# Patient Record
Sex: MALE | Race: WHITE | NOT HISPANIC OR LATINO | URBAN - METROPOLITAN AREA
[De-identification: names, ages, dates, MRNs, and addresses within clinical notes are randomized per-mention and may not be internally consistent; named-entity substitution may affect disease eponyms.]

---

## 2019-12-04 ENCOUNTER — OUTPATIENT (OUTPATIENT)
Dept: OUTPATIENT SERVICES | Facility: HOSPITAL | Age: 49
LOS: 1 days | End: 2019-12-04
Payer: COMMERCIAL

## 2019-12-04 VITALS
HEART RATE: 77 BPM | TEMPERATURE: 98 F | SYSTOLIC BLOOD PRESSURE: 129 MMHG | HEIGHT: 72 IN | RESPIRATION RATE: 18 BRPM | OXYGEN SATURATION: 95 % | DIASTOLIC BLOOD PRESSURE: 86 MMHG | WEIGHT: 266.98 LBS

## 2019-12-04 DIAGNOSIS — I10 ESSENTIAL (PRIMARY) HYPERTENSION: ICD-10-CM

## 2019-12-04 DIAGNOSIS — Z98.890 OTHER SPECIFIED POSTPROCEDURAL STATES: Chronic | ICD-10-CM

## 2019-12-04 DIAGNOSIS — Z98.89 OTHER SPECIFIED POSTPROCEDURAL STATES: Chronic | ICD-10-CM

## 2019-12-04 DIAGNOSIS — H50.10 UNSPECIFIED EXOTROPIA: Chronic | ICD-10-CM

## 2019-12-04 DIAGNOSIS — E11.9 TYPE 2 DIABETES MELLITUS WITHOUT COMPLICATIONS: ICD-10-CM

## 2019-12-04 DIAGNOSIS — Z01.818 ENCOUNTER FOR OTHER PREPROCEDURAL EXAMINATION: ICD-10-CM

## 2019-12-04 DIAGNOSIS — K80.20 CALCULUS OF GALLBLADDER WITHOUT CHOLECYSTITIS WITHOUT OBSTRUCTION: ICD-10-CM

## 2019-12-04 DIAGNOSIS — L02.91 CUTANEOUS ABSCESS, UNSPECIFIED: Chronic | ICD-10-CM

## 2019-12-04 LAB
ALBUMIN SERPL ELPH-MCNC: 4.6 G/DL — SIGNIFICANT CHANGE UP (ref 3.3–5)
ALP SERPL-CCNC: 55 U/L — SIGNIFICANT CHANGE UP (ref 40–120)
ALT FLD-CCNC: 33 U/L — SIGNIFICANT CHANGE UP (ref 10–45)
ANION GAP SERPL CALC-SCNC: 13 MMOL/L — SIGNIFICANT CHANGE UP (ref 5–17)
AST SERPL-CCNC: 24 U/L — SIGNIFICANT CHANGE UP (ref 10–40)
BILIRUB SERPL-MCNC: 1.2 MG/DL — SIGNIFICANT CHANGE UP (ref 0.2–1.2)
BUN SERPL-MCNC: 14 MG/DL — SIGNIFICANT CHANGE UP (ref 7–23)
CALCIUM SERPL-MCNC: 10.1 MG/DL — SIGNIFICANT CHANGE UP (ref 8.4–10.5)
CHLORIDE SERPL-SCNC: 103 MMOL/L — SIGNIFICANT CHANGE UP (ref 96–108)
CO2 SERPL-SCNC: 23 MMOL/L — SIGNIFICANT CHANGE UP (ref 22–31)
CREAT SERPL-MCNC: 0.76 MG/DL — SIGNIFICANT CHANGE UP (ref 0.5–1.3)
GLUCOSE SERPL-MCNC: 160 MG/DL — HIGH (ref 70–99)
HBA1C BLD-MCNC: 7.7 % — HIGH (ref 4–5.6)
HCT VFR BLD CALC: 50.2 % — HIGH (ref 39–50)
HGB BLD-MCNC: 17.3 G/DL — HIGH (ref 13–17)
MCHC RBC-ENTMCNC: 29 PG — SIGNIFICANT CHANGE UP (ref 27–34)
MCHC RBC-ENTMCNC: 34.5 GM/DL — SIGNIFICANT CHANGE UP (ref 32–36)
MCV RBC AUTO: 84.1 FL — SIGNIFICANT CHANGE UP (ref 80–100)
PLATELET # BLD AUTO: 175 K/UL — SIGNIFICANT CHANGE UP (ref 150–400)
POTASSIUM SERPL-MCNC: 4.4 MMOL/L — SIGNIFICANT CHANGE UP (ref 3.5–5.3)
POTASSIUM SERPL-SCNC: 4.4 MMOL/L — SIGNIFICANT CHANGE UP (ref 3.5–5.3)
PROT SERPL-MCNC: 8.1 G/DL — SIGNIFICANT CHANGE UP (ref 6–8.3)
RBC # BLD: 5.97 M/UL — HIGH (ref 4.2–5.8)
RBC # FLD: 13.2 % — SIGNIFICANT CHANGE UP (ref 10.3–14.5)
SODIUM SERPL-SCNC: 139 MMOL/L — SIGNIFICANT CHANGE UP (ref 135–145)
WBC # BLD: 7.36 K/UL — SIGNIFICANT CHANGE UP (ref 3.8–10.5)
WBC # FLD AUTO: 7.36 K/UL — SIGNIFICANT CHANGE UP (ref 3.8–10.5)

## 2019-12-04 PROCEDURE — 80053 COMPREHEN METABOLIC PANEL: CPT

## 2019-12-04 PROCEDURE — 85027 COMPLETE CBC AUTOMATED: CPT

## 2019-12-04 PROCEDURE — G0463: CPT

## 2019-12-04 PROCEDURE — 83036 HEMOGLOBIN GLYCOSYLATED A1C: CPT

## 2019-12-04 RX ORDER — VANCOMYCIN HCL 1 G
1500 VIAL (EA) INTRAVENOUS ONCE
Refills: 0 | Status: DISCONTINUED | OUTPATIENT
Start: 2019-12-10 | End: 2019-12-28

## 2019-12-04 RX ORDER — LIDOCAINE HCL 20 MG/ML
0.2 VIAL (ML) INJECTION ONCE
Refills: 0 | Status: DISCONTINUED | OUTPATIENT
Start: 2019-12-10 | End: 2019-12-10

## 2019-12-04 RX ORDER — SODIUM CHLORIDE 9 MG/ML
3 INJECTION INTRAMUSCULAR; INTRAVENOUS; SUBCUTANEOUS EVERY 8 HOURS
Refills: 0 | Status: DISCONTINUED | OUTPATIENT
Start: 2019-12-10 | End: 2019-12-10

## 2019-12-04 NOTE — H&P PST ADULT - HISTORY OF PRESENT ILLNESS
49yr old male presents to PST for a Laparoscopic Cholecystectomy on 12/10/2019. Pt w/ hx of HTN, HLD and DM2. Denies fevers, chills, N&V, chest pain or SOB and feels great otherwise. Pt to see PCP on 12/5/2019 for medical evaluation.

## 2019-12-04 NOTE — H&P PST ADULT - NSANTHOSAYNRD_GEN_A_CORE
No. NIRMAL screening performed.  STOP BANG Legend: 0-2 = LOW Risk; 3-4 = INTERMEDIATE Risk; 5-8 = HIGH Risk

## 2019-12-04 NOTE — H&P PST ADULT - NSICDXPROBLEM_GEN_ALL_CORE_FT
PROBLEM DIAGNOSES  Problem: Type 2 diabetes mellitus  Assessment and Plan: Pt to stop Invokamet on 12/6/2019 and last dose of pioglitazone on 12/9/2019. Pt to check FS morning of procedure.     Problem: Gallbladder calculus without cholecystitis and no obstruction  Assessment and Plan: Pt scheduled for sx 12/10/2019. Surgical and chlorhexidine instructions reviewed w/ pt.     Problem: Hypertension  Assessment and Plan: Pt to continue oral medication as prescribed.

## 2019-12-04 NOTE — H&P PST ADULT - NSICDXPASTMEDICALHX_GEN_ALL_CORE_FT
PAST MEDICAL HISTORY:  Diabetes Mellitus Type II     Gallbladder calculus without cholecystitis and no obstruction     Hypercholesterolemia     Hypertension

## 2019-12-04 NOTE — H&P PST ADULT - NSICDXPASTSURGICALHX_GEN_ALL_CORE_FT
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

## 2019-12-09 ENCOUNTER — TRANSCRIPTION ENCOUNTER (OUTPATIENT)
Age: 49
End: 2019-12-09

## 2019-12-10 ENCOUNTER — OUTPATIENT (OUTPATIENT)
Dept: OUTPATIENT SERVICES | Facility: HOSPITAL | Age: 49
LOS: 1 days | End: 2019-12-10
Payer: COMMERCIAL

## 2019-12-10 ENCOUNTER — RESULT REVIEW (OUTPATIENT)
Age: 49
End: 2019-12-10

## 2019-12-10 VITALS
RESPIRATION RATE: 16 BRPM | OXYGEN SATURATION: 97 % | HEART RATE: 78 BPM | SYSTOLIC BLOOD PRESSURE: 144 MMHG | TEMPERATURE: 98 F | DIASTOLIC BLOOD PRESSURE: 80 MMHG

## 2019-12-10 VITALS
SYSTOLIC BLOOD PRESSURE: 144 MMHG | OXYGEN SATURATION: 99 % | HEART RATE: 96 BPM | TEMPERATURE: 98 F | DIASTOLIC BLOOD PRESSURE: 76 MMHG | RESPIRATION RATE: 16 BRPM

## 2019-12-10 DIAGNOSIS — H50.10 UNSPECIFIED EXOTROPIA: Chronic | ICD-10-CM

## 2019-12-10 DIAGNOSIS — K80.20 CALCULUS OF GALLBLADDER WITHOUT CHOLECYSTITIS WITHOUT OBSTRUCTION: ICD-10-CM

## 2019-12-10 DIAGNOSIS — L02.91 CUTANEOUS ABSCESS, UNSPECIFIED: Chronic | ICD-10-CM

## 2019-12-10 DIAGNOSIS — Z98.890 OTHER SPECIFIED POSTPROCEDURAL STATES: Chronic | ICD-10-CM

## 2019-12-10 DIAGNOSIS — Z98.89 OTHER SPECIFIED POSTPROCEDURAL STATES: Chronic | ICD-10-CM

## 2019-12-10 LAB
GLUCOSE BLDC GLUCOMTR-MCNC: 199 MG/DL — HIGH (ref 70–99)
GLUCOSE BLDC GLUCOMTR-MCNC: 213 MG/DL — HIGH (ref 70–99)
GLUCOSE BLDC GLUCOMTR-MCNC: 279 MG/DL — HIGH (ref 70–99)

## 2019-12-10 PROCEDURE — 47562 LAPAROSCOPIC CHOLECYSTECTOMY: CPT

## 2019-12-10 PROCEDURE — 82962 GLUCOSE BLOOD TEST: CPT

## 2019-12-10 PROCEDURE — 88304 TISSUE EXAM BY PATHOLOGIST: CPT

## 2019-12-10 PROCEDURE — C1889: CPT

## 2019-12-10 PROCEDURE — 88304 TISSUE EXAM BY PATHOLOGIST: CPT | Mod: 26

## 2019-12-10 RX ORDER — SODIUM CHLORIDE 9 MG/ML
500 INJECTION, SOLUTION INTRAVENOUS
Refills: 0 | Status: DISCONTINUED | OUTPATIENT
Start: 2019-12-10 | End: 2019-12-28

## 2019-12-10 RX ORDER — ONDANSETRON 8 MG/1
4 TABLET, FILM COATED ORAL ONCE
Refills: 0 | Status: DISCONTINUED | OUTPATIENT
Start: 2019-12-10 | End: 2019-12-10

## 2019-12-10 RX ORDER — OXYCODONE HYDROCHLORIDE 5 MG/1
1 TABLET ORAL
Qty: 16 | Refills: 0
Start: 2019-12-10 | End: 2019-12-13

## 2019-12-10 RX ORDER — INSULIN LISPRO 100/ML
3 VIAL (ML) SUBCUTANEOUS ONCE
Refills: 0 | Status: COMPLETED | OUTPATIENT
Start: 2019-12-10 | End: 2019-12-10

## 2019-12-10 RX ORDER — IBUPROFEN 200 MG
3 TABLET ORAL
Qty: 60 | Refills: 0
Start: 2019-12-10 | End: 2019-12-14

## 2019-12-10 RX ORDER — ACETAMINOPHEN 500 MG
2 TABLET ORAL
Qty: 24 | Refills: 0
Start: 2019-12-10 | End: 2019-12-12

## 2019-12-10 RX ORDER — HYDROMORPHONE HYDROCHLORIDE 2 MG/ML
0.5 INJECTION INTRAMUSCULAR; INTRAVENOUS; SUBCUTANEOUS
Refills: 0 | Status: DISCONTINUED | OUTPATIENT
Start: 2019-12-10 | End: 2019-12-10

## 2019-12-10 RX ORDER — OXYCODONE HYDROCHLORIDE 5 MG/1
5 TABLET ORAL EVERY 6 HOURS
Refills: 0 | Status: DISCONTINUED | OUTPATIENT
Start: 2019-12-10 | End: 2019-12-10

## 2019-12-10 RX ADMIN — Medication 3 UNIT(S): at 18:08

## 2019-12-10 RX ADMIN — SODIUM CHLORIDE 3 MILLILITER(S): 9 INJECTION INTRAMUSCULAR; INTRAVENOUS; SUBCUTANEOUS at 12:45

## 2019-12-10 RX ADMIN — HYDROMORPHONE HYDROCHLORIDE 0.5 MILLIGRAM(S): 2 INJECTION INTRAMUSCULAR; INTRAVENOUS; SUBCUTANEOUS at 19:00

## 2019-12-10 RX ADMIN — SODIUM CHLORIDE 50 MILLILITER(S): 9 INJECTION, SOLUTION INTRAVENOUS at 18:09

## 2019-12-10 RX ADMIN — HYDROMORPHONE HYDROCHLORIDE 0.5 MILLIGRAM(S): 2 INJECTION INTRAMUSCULAR; INTRAVENOUS; SUBCUTANEOUS at 18:39

## 2019-12-10 NOTE — ASU DISCHARGE PLAN (ADULT/PEDIATRIC) - CARE PROVIDER_API CALL
Miguel Angel Cronin)  Surgery  [address], [address]  [place] [address]  Phone: [phone]  Fax: [phone]  Established Patient  Follow Up Time: 2 weeks

## 2019-12-10 NOTE — ASU PREOP CHECKLIST - IV STARTED
Patient transferred from [place] to [facility]. Dual skin assessment performed with LATANYA Dougherty. No concerns at this time. Pt stable,A&O x 4 and ambulates independently. Will continue to monitor.    yes

## 2019-12-10 NOTE — BRIEF OPERATIVE NOTE - ELECTIVE PROCEDURE
Yes
Anxiety    Breast cancer in female    GERD (gastroesophageal reflux disease)    HTN (hypertension)    Hyperlipidemia

## 2019-12-10 NOTE — BRIEF OPERATIVE NOTE - NSICDXBRIEFPOSTOP_GEN_ALL_CORE_FT
POST-OP DIAGNOSIS:  Calculus of gallbladder without cholecystitis without obstruction 10-Dec-2019 17:35:56  Asad Hendricks

## 2019-12-10 NOTE — BRIEF OPERATIVE NOTE - NSICDXBRIEFPREOP_GEN_ALL_CORE_FT
PRE-OP DIAGNOSIS:  Calculus of gallbladder without cholecystitis without obstruction 10-Dec-2019 17:35:47  Asad Hendricks

## 2019-12-10 NOTE — ASU DISCHARGE PLAN (ADULT/PEDIATRIC) - CALL YOUR DOCTOR IF YOU HAVE ANY OF THE FOLLOWING:
Wound/Surgical Site with redness, or foul smelling discharge or pus/Swelling that gets worse/Pain not relieved by Medications

## 2019-12-17 LAB — SURGICAL PATHOLOGY STUDY: SIGNIFICANT CHANGE UP

## 2020-02-04 NOTE — PACU DISCHARGE NOTE - NSCLINEINSERTRD_GEN_ALL_CORE
No Remote history, treated with lumpectomy, RT and Tamoxifen, no chemotherapy  currently in remission   follows with Dr. Blank Continue with home medication of Amlodipine

## 2021-02-01 NOTE — ASU DISCHARGE PLAN (ADULT/PEDIATRIC) - NO HEAVY LIFTING DURATION
Pt stated he went to [facility] for a 72 hour hold. Is now need paper work filled out for [employer]. Pt stated he seen Dr. Jade Waters for this. Put paper work in Dr. Diana Talamantes.     Last appt.  1/21/2021  Future Appointments   Date Time Provider Aashish Staley   3/18/2021  7:30 AM Smith Mark DO [facility] [place]
2 weeks

## 2021-03-08 NOTE — H&P PST ADULT - VENOUS THROMBOEMBOLISM
Partially impaired: cannot see medication labels or newsprint, but can see obstacles in path, and the surrounding layout; can count fingers at arm's length
no

## 2023-05-03 PROBLEM — K80.20 CALCULUS OF GALLBLADDER WITHOUT CHOLECYSTITIS WITHOUT OBSTRUCTION: Chronic | Status: ACTIVE | Noted: 2019-12-04

## 2023-05-03 PROBLEM — I10 ESSENTIAL (PRIMARY) HYPERTENSION: Chronic | Status: ACTIVE | Noted: 2019-12-04

## 2023-06-26 ENCOUNTER — LABORATORY RESULT (OUTPATIENT)
Age: 53
End: 2023-06-26

## 2023-06-26 ENCOUNTER — APPOINTMENT (OUTPATIENT)
Dept: ORTHOPEDIC SURGERY | Facility: CLINIC | Age: 53
End: 2023-06-26
Payer: COMMERCIAL

## 2023-06-26 VITALS — WEIGHT: 253 LBS | BODY MASS INDEX: 34.27 KG/M2 | HEIGHT: 72 IN

## 2023-06-26 DIAGNOSIS — Z86.39 PERSONAL HISTORY OF OTHER ENDOCRINE, NUTRITIONAL AND METABOLIC DISEASE: ICD-10-CM

## 2023-06-26 DIAGNOSIS — Z78.9 OTHER SPECIFIED HEALTH STATUS: ICD-10-CM

## 2023-06-26 LAB
B PERT IGG+IGM PNL SER: ABNORMAL
COLOR FLD: YELLOW
EOSINOPHIL # FLD MANUAL: 0 %
FLUID INTAKE SUBSTANCE CLASS: NORMAL
LYMPHOCYTES # FLD MANUAL: 46 %
MESOTHL CELL NFR FLD: 0 %
MONOS+MACROS NFR FLD MANUAL: 39 %
NEUTS SEG # FLD MANUAL: 15 %
NRBC # FLD: 0 %
RBC # FLD MANUAL: ABNORMAL /UL
SYCRY CLARITY: ABNORMAL
SYCRY COLOR: YELLOW
SYCRY ID: ABNORMAL
SYCRY TUBE: NORMAL
TOTAL CELLS COUNTED FLD: 382 /UL
TUBE TYPE: NORMAL
UNIDENT CELLS NFR FLD MANUAL: 0 %
VARIANT LYMPHS # FLD MANUAL: 0 %

## 2023-06-26 PROCEDURE — 20610 DRAIN/INJ JOINT/BURSA W/O US: CPT | Mod: RT

## 2023-06-26 PROCEDURE — 99204 OFFICE O/P NEW MOD 45 MIN: CPT | Mod: 25

## 2023-06-26 PROCEDURE — 73562 X-RAY EXAM OF KNEE 3: CPT | Mod: RT

## 2023-06-26 RX ORDER — ATORVASTATIN CALCIUM 20 MG/1
20 TABLET, FILM COATED ORAL
Refills: 0 | Status: ACTIVE | COMMUNITY

## 2023-06-26 RX ORDER — LOSARTAN POTASSIUM 25 MG/1
25 TABLET, FILM COATED ORAL
Refills: 0 | Status: ACTIVE | COMMUNITY

## 2023-06-26 NOTE — HISTORY OF PRESENT ILLNESS
[Worsening] : worsening [8] : a minimum pain level of 8/10 [10] : a maximum pain level of 10/10 [de-identified] : 52 y/o M pt presents with right knee pain. The pt has been having pain since April 2021 at [facility]. He has a prior right knee replacement. He has a painful total knee. He talked with a different orthopedist. He is here for a 2nd opinion. He has an MRI of the right knee. right knee replacement. The notes constant pain and localized.\par He states his pain is sharp, achy, and throbbing. He was seen by neurootologist and his blood work showed high SED. He is ambulating with a cane. He notes worsening pain with bending and walking. He has a mhx of DM.

## 2023-06-26 NOTE — DISCUSSION/SUMMARY
[Medication Risks Reviewed] : Medication risks reviewed [Surgical risks reviewed] : Surgical risks reviewed [de-identified] : 54 y/o M pt is s/p painful right TKA from 2021. We reassured the pt that his implants are stable with no evidence of loosening or wear. We discussed a possibility of a revision right TKA, however the pt understands the risk of surgery with no guarantee that it will improve his pain. We conducted an aspiration of the right knee and we will send it for cell count, cultures, and crystals to rule out infection. The pt will f/u with us via telehealth to discuss the aspiration results. All questions were answered.

## 2023-06-26 NOTE — PHYSICAL EXAM
[de-identified] : GENERAL APPEARANCE: Well nourished and hydrated, pleasant, alert, and oriented x 3. Appears their stated age. \par HEENT: Normocephalic, extraocular eye motion intact. Nasal septum midline. Oral cavity clear. External auditory canal clear. \par RESPIRATORY: Breath sounds clear and audible in all lobes. No wheezing, No accessory muscle use.\par CARDIOVASCULAR: No apparent abnormalities. No lower leg edema. No varicosities. Pedal pulses are palpable.\par NEUROLOGIC: Sensation is normal, no muscle weakness in the upper or lower extremities.\par DERMATOLOGIC: No apparent skin lesions, moist, warm, no rash.\par SPINE: Cervical spine appears normal and moves freely; thoracic spine appears normal and moves freely; lumbosacral spine appears normal and moves freely, normal, nontender.\par MUSCULOSKELETAL: Hands, wrists, and elbows are normal and move freely, shoulders are normal and move freely.  [de-identified] : Right knee exam shows healed incision with no sign of infection. ROM 0-95 [de-identified] : 3V xray of the right knee done in office today and reviewed by Dr. Agustin Yune demonstrates s/p right knee implants in good positioning with no evidence of wear, loosening, or subsidence. \par \par MRI of the right knee done at [facility] on March 7th, 2023 shows: \par 1. Limited study. Severe susceptibility artifact present from component of total knee arthroplasty. \par 2. Finding concerning for the possibility of preprosthetic fracture in both the distal femur and proximal tibia. Recommend CT examination for further evaluation.

## 2023-07-03 ENCOUNTER — APPOINTMENT (OUTPATIENT)
Dept: ORTHOPEDIC SURGERY | Facility: CLINIC | Age: 53
End: 2023-07-03
Payer: COMMERCIAL

## 2023-07-03 PROCEDURE — 99442: CPT

## 2023-07-03 NOTE — HISTORY OF PRESENT ILLNESS
[Home] : at home, [unfilled] , at the time of the visit. [Medical Office: ([facility])___] : at the medical office located in  [Verbal consent obtained from patient] : the patient, [unfilled] [de-identified] : I called the patient for follow-up of his painful right total knee arthroplasty.  He does have extracellular CPPD crystals noted.  The cell count was reassuring at 382 nucleated cells.  His cultures remain negative.  I think we can say that we ruled out infection.  He does have another aspiration planned with the radiologist prior to his follow-up with Dr. Schumacher.  I encouraged him to see Dr. Schumacher back.  I noted that we would resend the results of the aspiration to the patient.  At this point I do not see a clear-cut indication  for revision surgery.  I encouraged him to see a rheumatologist to discuss the CPPD crystals.\par \par He will follow-up with me on an as-needed basis

## 2023-07-03 NOTE — DISCUSSION/SUMMARY
[de-identified] : I called the patient for follow-up of his painful right total knee arthroplasty.  He does have extracellular CPPD crystals noted.  The cell count was reassuring at 382 nucleated cells.  His cultures remain negative.  I think we can say that we ruled out infection.  He does have another aspiration planned with the radiologist prior to his follow-up with Dr. Schumacher.  I encouraged him to see Dr. Schumacher back.  I noted that we would resend the results of the aspiration to the patient.  At this point I do not see a clear-cut indication  for revision surgery.  I encouraged him to see a rheumatologist to discuss the CPPD crystals.\par \par He will follow-up with me on an as-needed basis

## 2023-11-06 ENCOUNTER — APPOINTMENT (OUTPATIENT)
Dept: ORTHOPEDIC SURGERY | Facility: CLINIC | Age: 53
End: 2023-11-06

## 2023-11-09 ENCOUNTER — APPOINTMENT (OUTPATIENT)
Dept: ORTHOPEDIC SURGERY | Facility: CLINIC | Age: 53
End: 2023-11-09
Payer: COMMERCIAL

## 2023-11-09 PROCEDURE — 99214 OFFICE O/P EST MOD 30 MIN: CPT

## 2023-11-09 PROCEDURE — 73562 X-RAY EXAM OF KNEE 3: CPT | Mod: 26,RT

## 2023-11-09 RX ORDER — CELECOXIB 200 MG/1
200 CAPSULE ORAL
Qty: 60 | Refills: 0 | Status: ACTIVE | COMMUNITY
Start: 2023-11-09 | End: 1900-01-01

## 2023-11-21 ENCOUNTER — OFFICE (OUTPATIENT)
Dept: URBAN - METROPOLITAN AREA CLINIC 6 | Facility: CLINIC | Age: 53
Setting detail: OPHTHALMOLOGY
End: 2023-11-21
Payer: COMMERCIAL

## 2023-11-21 ENCOUNTER — RX ONLY (RX ONLY)
Age: 53
End: 2023-11-21

## 2023-11-21 DIAGNOSIS — E11.3293: ICD-10-CM

## 2023-11-21 PROBLEM — H52.7 REFRACTIVE ERROR: Status: ACTIVE | Noted: 2023-11-21

## 2023-11-21 PROBLEM — E11.9 DIABETES TYPE 2 NO RETINOPATHY; BOTH MILD WITHOUT ME: Status: ACTIVE | Noted: 2023-11-21

## 2023-11-21 PROBLEM — H50.10 EXOTROPIA, UNSPECIFIED: Status: ACTIVE | Noted: 2023-11-21

## 2023-11-21 PROCEDURE — 92004 COMPRE OPH EXAM NEW PT 1/>: CPT | Performed by: OPHTHALMOLOGY

## 2023-11-21 ASSESSMENT — REFRACTION_MANIFEST
OS_SPHERE: -1.50
OD_SPHERE: -1.00
OD_SPHERE: -1.00
OD_AXIS: 025
OS_VA1: 20/20
OD_VA1: 20/20
OS_VA1: 20/20
OU_VA: 20/20
OD_CYLINDER: -1.50
OD_VA1: 20/20
OD_CYLINDER: -1.50
OS_SPHERE: -1.50
OS_AXIS: 145
OS_CYLINDER: -0.50
OD_AXIS: 025
OS_CYLINDER: -0.50
OU_VA: 20/20
OS_AXIS: 145

## 2023-11-21 ASSESSMENT — REFRACTION_AUTOREFRACTION
OD_AXIS: 024
OS_SPHERE: -1.50
OS_AXIS: 146
OD_CYLINDER: -1.50
OS_CYLINDER: -0.50
OD_SPHERE: -1.00

## 2023-11-21 ASSESSMENT — SPHEQUIV_DERIVED
OD_SPHEQUIV: -1.75
OS_SPHEQUIV: -1.75
OD_SPHEQUIV: -1.75
OS_SPHEQUIV: -1.75
OS_SPHEQUIV: -1.75
OD_SPHEQUIV: -1.75

## 2023-11-21 ASSESSMENT — REFRACTION_CURRENTRX
OS_SPHERE: -1.25
OS_AXIS: 162
OD_SPHERE: -0.75
OS_OVR_VA: 20/
OD_CYLINDER: -2.00
OD_OVR_VA: 20/
OS_VPRISM_DIRECTION: SV
OS_CYLINDER: -1.00
OD_VPRISM_DIRECTION: SV
OD_AXIS: 014

## 2023-11-21 ASSESSMENT — CONFRONTATIONAL VISUAL FIELD TEST (CVF)
OS_FINDINGS: FULL
OD_FINDINGS: FULL

## 2024-01-24 ENCOUNTER — OFFICE (OUTPATIENT)
Dept: URBAN - METROPOLITAN AREA CLINIC 6 | Facility: CLINIC | Age: 54
Setting detail: OPHTHALMOLOGY
End: 2024-01-24
Payer: COMMERCIAL

## 2024-01-24 DIAGNOSIS — H50.21: ICD-10-CM

## 2024-01-24 DIAGNOSIS — H50.10: ICD-10-CM

## 2024-01-24 PROCEDURE — 92060 SENSORIMOTOR EXAMINATION: CPT | Performed by: OPHTHALMOLOGY

## 2024-01-24 PROCEDURE — 99214 OFFICE O/P EST MOD 30 MIN: CPT | Performed by: OPHTHALMOLOGY

## 2024-01-24 ASSESSMENT — REFRACTION_AUTOREFRACTION
OS_CYLINDER: -0.50
OS_SPHERE: -1.75
OD_AXIS: 023
OD_SPHERE: -1.00
OS_AXIS: 145
OD_CYLINDER: -1.75

## 2024-01-24 ASSESSMENT — REFRACTION_MANIFEST
OD_VA1: 20/20
OD_SPHERE: -1.00
OD_VA1: 20/20
OD_AXIS: 025
OD_CYLINDER: -1.50
OD_CYLINDER: -1.50
OS_VA1: 20/20
OU_VA: 20/20
OS_SPHERE: -1.50
OS_AXIS: 145
OS_AXIS: 145
OD_AXIS: 025
OU_VA: 20/20
OD_SPHERE: -1.00
OS_VA1: 20/20
OS_SPHERE: -1.50
OS_CYLINDER: -0.50
OS_CYLINDER: -0.50

## 2024-01-24 ASSESSMENT — REFRACTION_CURRENTRX
OD_SPHERE: -0.75
OS_SPHERE: -1.25
OS_AXIS: 162
OD_VPRISM_DIRECTION: SV
OD_OVR_VA: 20/
OD_CYLINDER: -2.00
OS_OVR_VA: 20/
OS_VPRISM_DIRECTION: SV
OS_CYLINDER: -1.00
OD_AXIS: 014

## 2024-01-24 ASSESSMENT — SPHEQUIV_DERIVED
OD_SPHEQUIV: -1.875
OS_SPHEQUIV: -2
OS_SPHEQUIV: -1.75
OD_SPHEQUIV: -1.75
OD_SPHEQUIV: -1.75
OS_SPHEQUIV: -1.75

## 2024-01-24 ASSESSMENT — CONFRONTATIONAL VISUAL FIELD TEST (CVF)
OS_FINDINGS: FULL
OD_FINDINGS: FULL

## 2024-02-15 ENCOUNTER — APPOINTMENT (OUTPATIENT)
Dept: ORTHOPEDIC SURGERY | Facility: CLINIC | Age: 54
End: 2024-02-15
Payer: COMMERCIAL

## 2024-02-15 VITALS
BODY MASS INDEX: 34.13 KG/M2 | HEIGHT: 72 IN | DIASTOLIC BLOOD PRESSURE: 83 MMHG | HEART RATE: 89 BPM | WEIGHT: 252 LBS | SYSTOLIC BLOOD PRESSURE: 157 MMHG

## 2024-02-15 PROCEDURE — 99213 OFFICE O/P EST LOW 20 MIN: CPT

## 2024-02-15 NOTE — HISTORY OF PRESENT ILLNESS
[de-identified] : This is a 53 year old male here for follow up of right knee pain. He had RTKA in 2021 at [facility]. He was last seen in July for aspiration which was positive for pseudogout. He had repeat aspiration at [place] with his surgeon which was negative for any infection. he continues to have the sensation of instability in the right knee. He did have MRi which showed well fixed implants without complication. He does report persistent swelling in the knee. He has been takin 100mg of Celebrex once a day. She continues to have pain in the right knee that limits him from doing his normal activities.  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  He feels the knee is unstable and is going to give out on him.  He walks with a cane.  He feels this keeps him from his activities of daily living

## 2024-02-15 NOTE — DISCUSSION/SUMMARY
[de-identified] : Medication risks reviewed. Surgical risks reviewed. 52 y/o M pt is s/p painful right TKA from 2021. IHe has had two prior negative aspirations of the knee. Dr. Yuen has previously discussed a possibility of a revision right TKA, however the pt understands the risk of surgery with no guarantee that it will improve his pain. The patient will take Celebrex 200mg twice a day and was encouraged to follow up with rheumatology. The patient is going on a trip next week and will follow-up in the office with Dr. Yuen when he is back to discuss possible revision surgery

## 2024-02-15 NOTE — PHYSICAL EXAM
[de-identified] : GENERAL APPEARANCE: Well nourished and hydrated, pleasant, alert, and oriented x 3. Appears their stated age.  HEENT: Normocephalic, extraocular eye motion intact. Nasal septum midline. Oral cavity clear. External auditory canal clear.  RESPIRATORY: Breath sounds clear and audible in all lobes. No wheezing, No accessory muscle use.  CARDIOVASCULAR: No apparent abnormalities. No lower leg edema. No varicosities. Pedal pulses are palpable.  NEUROLOGIC: Sensation is normal, no muscle weakness in the upper or lower extremities.  DERMATOLOGIC: No apparent skin lesions, moist, warm, no rash.  SPINE: Cervical spine appears normal and moves freely; thoracic spine appears normal and moves freely; lumbosacral spine appears normal and moves freely, normal, nontender.  MUSCULOSKELETAL: Hands, wrists, and elbows are normal and move freely, shoulders are normal and move freely.      [de-identified] : Musculoskeletal:. Right knee exam shows healed incision with no sign of infection. ROM 0-105 [de-identified] :  Imaging: 3V xray of the right knee done in office today and reviewed by Dr. Agustin Yuen demonstrates s/p right knee implants in good positioning with no evidence of wear, loosening, or subsidence.

## 2024-03-06 ENCOUNTER — APPOINTMENT (OUTPATIENT)
Dept: ORTHOPEDIC SURGERY | Facility: CLINIC | Age: 54
End: 2024-03-06
Payer: COMMERCIAL

## 2024-03-06 VITALS — HEIGHT: 72 IN | WEIGHT: 252 LBS | BODY MASS INDEX: 34.13 KG/M2

## 2024-03-06 PROCEDURE — 99214 OFFICE O/P EST MOD 30 MIN: CPT

## 2024-03-06 RX ORDER — CELECOXIB 200 MG/1
200 CAPSULE ORAL TWICE DAILY
Qty: 60 | Refills: 1 | Status: ACTIVE | COMMUNITY
Start: 2024-03-06 | End: 1900-01-01

## 2024-03-06 NOTE — REVIEW OF SYSTEMS
[Joint Pain] : joint pain [Joint Swelling] : joint swelling [Joint Stiffness] : joint stiffness [Negative] : Heme/Lymph [[email or web address]] : Bilateral knee pain

## 2024-03-06 NOTE — DISCUSSION/SUMMARY
[Medication Risks Reviewed] : Medication risks reviewed [Surgical risks reviewed] : Surgical risks reviewed [de-identified] : 52 y/o M pt is s/p painful right TKA from 2021. The nature of his condition and treatment options were discussed in detail. He had RTKA in 2021 at [facility]. He was last seen in July for aspiration which was positive for pseudogout. He had repeat aspiration at [place] with his surgeon which was negative for any infection. he continues to have the sensation of instability in the right knee. He did have MRi which showed well fixed implants without complication. The pt is having worsening quality of life. The pt has exhausted conservative treatment such as anti-inflammatories (Celebrex) and low impact exercise. She continues to have pain in the right knee that limits him from doing his normal activities. The pt is losing his quality of life and is unable to enjoy his normal activities. 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  The pt understands that revision surgery is unpredictable and there is no guarantee that it will improve his pain control. The pt will talk with the surgical coordinator to schedule a revision right TKA. All questions were answered.   The patient is a 54 year year old individual with a failing right TKA. Based upon the patient's continued symptoms and failure to respond to conservative treatment (including HA injections, cortisone injections, over the counter medications, and PT) I have recommended a revision right total knee arthroplasty for this patient. A long discussion took place with the patient describing what a total joint replacement is and what the procedure would entail. A total knee arthroplasty model, similar to the implant that will be used during the operation, was utilized to demonstrate and to discuss the various bearing surfaces of the implants. The hospitalization and post-operative care and rehabilitation were also discussed. The use of perioperative antibiotics and DVT prophylaxis were discussed. The risk, benefits and alternatives to a surgical intervention were discussed at length with the patient. The patient was also advised of risks related to the medical comorbidities, elevated body mass index (BMI), and smoking where applicable. We discussed how to reduce modifiable risk factors and encouraged smoking cessation were applicable. A lengthy discussion took place to review the most common complications including but not limited to: deep vein thrombosis, pulmonary embolus, heart attack, stroke, infection, wound breakdown, numbness, damage to nerves, tendon, muscles, arteries or other blood vessels, death and other possible complications from anesthesia. The patient was told that we will take steps to minimize these risks by using sterile technique, antibiotics and DVT prophylaxis when appropriate and follow the patient postoperatively in the office setting to monitor progress. The possibility of recurrent pain, no improvement in pain and actual worsening of pain were also discussed with the patient. The discharge plan of care focused on the patient going home following surgery. The patient was encouraged to make the necessary arrangements to have someone stay with them when they are discharged home. Following discharge, a home care nurse will visit the patient. The home care nurse will open your home care case and request home physical therapy services. Home physical therapy will commence following discharge provided it is appropriate and covered by the health insurance benefit plan.  The benefits of surgery were discussed with the patient including the potential for improving his/her current clinical condition through operative intervention. Alternatives to surgical intervention including continued conservative management were also discussed in detail. All questions were answered to the satisfaction of the patient. The treatment plan of care, as well as a model of a total knee arthroplasty equivalent to the one that will be used for their total joint replacement, was shared with the patient. The patient agreed to the plan of care as well as the use of implants in their total joint replacement.

## 2024-03-06 NOTE — PHYSICAL EXAM
[LE] : Sensory: Intact in bilateral lower extremities [ALL] : Biceps, brachioradialis, triceps, patellar, ankle and plantar 2+ and symmetric bilaterally [de-identified] : GENERAL APPEARANCE: Well nourished and hydrated, pleasant, alert, and oriented x 3. Appears their stated age.  HEENT: Normocephalic, extraocular eye motion intact. Nasal septum midline. Oral cavity clear. External auditory canal clear.  RESPIRATORY: Breath sounds clear and audible in all lobes. No wheezing, No accessory muscle use.  CARDIOVASCULAR: No apparent abnormalities. No lower leg edema. No varicosities. Pedal pulses are palpable.  NEUROLOGIC: Sensation is normal, no muscle weakness in the upper or lower extremities.  DERMATOLOGIC: No apparent skin lesions, moist, warm, no rash.  SPINE: Cervical spine appears normal and moves freely; thoracic spine appears normal and moves freely; lumbosacral spine appears normal and moves freely, normal, nontender.  MUSCULOSKELETAL: Hands, wrists, and elbows are normal and move freely, shoulders are normal and move freely.      [de-identified] : Musculoskeletal:. Right knee exam shows healed incision with no sign of infection. ROM 0-105

## 2024-03-06 NOTE — HISTORY OF PRESENT ILLNESS
[Worsening] : worsening [6] : a current pain level of 6/10 [3] : a minimum pain level of 3/10 [9] : a maximum pain level of 9/10 [de-identified] : This is a 53 year old male here for follow up of right knee pain. [facility] He was last seen in July for aspiration which was positive for pseudogout. He had repeat aspiration at [place] with his surgeon which was negative for any infection. he continues to have the sensation of instability in the right knee. He did have MRi which showed well fixed implants without complication. He continues to have swelling of the right knee. The pt takes 100 mg Celebrex in a day. The pt was seen by ALLYSON Medina in Feb 15th, 2024 and noted very severe pain. He has been takin 100mg of Celebrex once a day. She continues to have pain in the right knee that limits him from doing his normal activities. The pt is losing his quality of life and is unable to enjoy his normal activities. 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  He feels the knee is unstable and is going to give out on him.  He walks with a cane. The pt is here for discussion for revision total knee.

## 2024-04-18 ENCOUNTER — OFFICE (OUTPATIENT)
Dept: URBAN - METROPOLITAN AREA CLINIC 6 | Facility: CLINIC | Age: 54
Setting detail: OPHTHALMOLOGY
End: 2024-04-18
Payer: COMMERCIAL

## 2024-04-18 DIAGNOSIS — H50.10: ICD-10-CM

## 2024-04-18 DIAGNOSIS — H25.13: ICD-10-CM

## 2024-04-18 DIAGNOSIS — H50.21: ICD-10-CM

## 2024-04-18 PROCEDURE — 99213 OFFICE O/P EST LOW 20 MIN: CPT | Performed by: OPHTHALMOLOGY

## 2024-04-18 PROCEDURE — 92060 SENSORIMOTOR EXAMINATION: CPT | Performed by: OPHTHALMOLOGY

## 2024-05-09 ENCOUNTER — OUTPATIENT (OUTPATIENT)
Dept: OUTPATIENT SERVICES | Facility: HOSPITAL | Age: 54
LOS: 1 days | End: 2024-05-09
Payer: COMMERCIAL

## 2024-05-09 ENCOUNTER — TRANSCRIPTION ENCOUNTER (OUTPATIENT)
Age: 54
End: 2024-05-09

## 2024-05-09 VITALS
OXYGEN SATURATION: 97 % | SYSTOLIC BLOOD PRESSURE: 143 MMHG | RESPIRATION RATE: 18 BRPM | WEIGHT: 255.96 LBS | HEART RATE: 96 BPM | TEMPERATURE: 98 F | DIASTOLIC BLOOD PRESSURE: 70 MMHG | HEIGHT: 72 IN

## 2024-05-09 VITALS
RESPIRATION RATE: 16 BRPM | DIASTOLIC BLOOD PRESSURE: 90 MMHG | HEART RATE: 86 BPM | TEMPERATURE: 98 F | OXYGEN SATURATION: 96 % | SYSTOLIC BLOOD PRESSURE: 146 MMHG

## 2024-05-09 DIAGNOSIS — L02.91 CUTANEOUS ABSCESS, UNSPECIFIED: Chronic | ICD-10-CM

## 2024-05-09 DIAGNOSIS — H50.10 UNSPECIFIED EXOTROPIA: Chronic | ICD-10-CM

## 2024-05-09 DIAGNOSIS — Z98.890 OTHER SPECIFIED POSTPROCEDURAL STATES: Chronic | ICD-10-CM

## 2024-05-09 DIAGNOSIS — R94.39 ABNORMAL RESULT OF OTHER CARDIOVASCULAR FUNCTION STUDY: ICD-10-CM

## 2024-05-09 DIAGNOSIS — Z98.89 OTHER SPECIFIED POSTPROCEDURAL STATES: Chronic | ICD-10-CM

## 2024-05-09 LAB
ANION GAP SERPL CALC-SCNC: 13 MMOL/L — SIGNIFICANT CHANGE UP (ref 5–17)
BUN SERPL-MCNC: 20 MG/DL — SIGNIFICANT CHANGE UP (ref 7–23)
CALCIUM SERPL-MCNC: 9.4 MG/DL — SIGNIFICANT CHANGE UP (ref 8.4–10.5)
CHLORIDE SERPL-SCNC: 103 MMOL/L — SIGNIFICANT CHANGE UP (ref 96–108)
CO2 SERPL-SCNC: 23 MMOL/L — SIGNIFICANT CHANGE UP (ref 22–31)
CREAT SERPL-MCNC: 0.88 MG/DL — SIGNIFICANT CHANGE UP (ref 0.5–1.3)
EGFR: 102 ML/MIN/1.73M2 — SIGNIFICANT CHANGE UP
GLUCOSE BLDC GLUCOMTR-MCNC: 187 MG/DL — HIGH (ref 70–99)
GLUCOSE BLDC GLUCOMTR-MCNC: 210 MG/DL — HIGH (ref 70–99)
GLUCOSE BLDC GLUCOMTR-MCNC: 264 MG/DL — HIGH (ref 70–99)
GLUCOSE SERPL-MCNC: 269 MG/DL — HIGH (ref 70–99)
HCT VFR BLD CALC: 46.8 % — SIGNIFICANT CHANGE UP (ref 39–50)
HGB BLD-MCNC: 16.5 G/DL — SIGNIFICANT CHANGE UP (ref 13–17)
MCHC RBC-ENTMCNC: 29.7 PG — SIGNIFICANT CHANGE UP (ref 27–34)
MCHC RBC-ENTMCNC: 35.3 GM/DL — SIGNIFICANT CHANGE UP (ref 32–36)
MCV RBC AUTO: 84.2 FL — SIGNIFICANT CHANGE UP (ref 80–100)
NRBC # BLD: 0 /100 WBCS — SIGNIFICANT CHANGE UP (ref 0–0)
PLATELET # BLD AUTO: 130 K/UL — LOW (ref 150–400)
POTASSIUM SERPL-MCNC: 4.2 MMOL/L — SIGNIFICANT CHANGE UP (ref 3.5–5.3)
POTASSIUM SERPL-SCNC: 4.2 MMOL/L — SIGNIFICANT CHANGE UP (ref 3.5–5.3)
RBC # BLD: 5.56 M/UL — SIGNIFICANT CHANGE UP (ref 4.2–5.8)
RBC # FLD: 13.5 % — SIGNIFICANT CHANGE UP (ref 10.3–14.5)
SODIUM SERPL-SCNC: 139 MMOL/L — SIGNIFICANT CHANGE UP (ref 135–145)
WBC # BLD: 6.2 K/UL — SIGNIFICANT CHANGE UP (ref 3.8–10.5)
WBC # FLD AUTO: 6.2 K/UL — SIGNIFICANT CHANGE UP (ref 3.8–10.5)

## 2024-05-09 PROCEDURE — C1874: CPT

## 2024-05-09 PROCEDURE — C1725: CPT

## 2024-05-09 PROCEDURE — 93454 CORONARY ARTERY ANGIO S&I: CPT | Mod: 59

## 2024-05-09 PROCEDURE — 36415 COLL VENOUS BLD VENIPUNCTURE: CPT

## 2024-05-09 PROCEDURE — 92928 PRQ TCAT PLMT NTRAC ST 1 LES: CPT | Mod: LD

## 2024-05-09 PROCEDURE — 99152 MOD SED SAME PHYS/QHP 5/>YRS: CPT

## 2024-05-09 PROCEDURE — 93454 CORONARY ARTERY ANGIO S&I: CPT | Mod: 26,59

## 2024-05-09 PROCEDURE — 82962 GLUCOSE BLOOD TEST: CPT

## 2024-05-09 PROCEDURE — C1887: CPT

## 2024-05-09 PROCEDURE — C1769: CPT

## 2024-05-09 PROCEDURE — C9600: CPT | Mod: LD

## 2024-05-09 PROCEDURE — 80048 BASIC METABOLIC PNL TOTAL CA: CPT

## 2024-05-09 PROCEDURE — 93005 ELECTROCARDIOGRAM TRACING: CPT

## 2024-05-09 PROCEDURE — C1894: CPT

## 2024-05-09 PROCEDURE — 93010 ELECTROCARDIOGRAM REPORT: CPT | Mod: 76

## 2024-05-09 PROCEDURE — 85027 COMPLETE CBC AUTOMATED: CPT

## 2024-05-09 RX ORDER — ATORVASTATIN CALCIUM 80 MG/1
1 TABLET, FILM COATED ORAL
Qty: 30 | Refills: 3
Start: 2024-05-09 | End: 2024-09-05

## 2024-05-09 RX ORDER — CLOPIDOGREL BISULFATE 75 MG/1
75 TABLET, FILM COATED ORAL DAILY
Refills: 0 | Status: DISCONTINUED | OUTPATIENT
Start: 2024-05-09 | End: 2024-05-23

## 2024-05-09 RX ORDER — DULAGLUTIDE 4.5 MG/.5ML
3 INJECTION, SOLUTION SUBCUTANEOUS
Refills: 0 | DISCHARGE

## 2024-05-09 RX ORDER — PRAZOSIN HCL 2 MG
1 CAPSULE ORAL
Refills: 0 | DISCHARGE

## 2024-05-09 RX ORDER — DULOXETINE HYDROCHLORIDE 30 MG/1
30 CAPSULE, DELAYED RELEASE ORAL DAILY
Refills: 0 | Status: DISCONTINUED | OUTPATIENT
Start: 2024-05-09 | End: 2024-05-09

## 2024-05-09 RX ORDER — ASPIRIN/CALCIUM CARB/MAGNESIUM 324 MG
0 TABLET ORAL
Refills: 0 | DISCHARGE

## 2024-05-09 RX ORDER — EMPAGLIFLOZIN, METFORMIN HYDROCHLORIDE 10; 1000 MG/1; MG/1
1 TABLET, EXTENDED RELEASE ORAL
Qty: 0 | Refills: 0 | DISCHARGE

## 2024-05-09 RX ORDER — PANTOPRAZOLE SODIUM 20 MG/1
40 TABLET, DELAYED RELEASE ORAL
Refills: 0 | Status: DISCONTINUED | OUTPATIENT
Start: 2024-05-09 | End: 2024-05-23

## 2024-05-09 RX ORDER — OMEPRAZOLE 10 MG/1
1 CAPSULE, DELAYED RELEASE ORAL
Refills: 0 | DISCHARGE

## 2024-05-09 RX ORDER — DULOXETINE HYDROCHLORIDE 30 MG/1
1 CAPSULE, DELAYED RELEASE ORAL
Qty: 0 | Refills: 0 | DISCHARGE

## 2024-05-09 RX ORDER — LOSARTAN POTASSIUM 100 MG/1
25 TABLET, FILM COATED ORAL DAILY
Refills: 0 | Status: DISCONTINUED | OUTPATIENT
Start: 2024-05-09 | End: 2024-05-23

## 2024-05-09 RX ORDER — INSULIN LISPRO 100/ML
VIAL (ML) SUBCUTANEOUS AT BEDTIME
Refills: 0 | Status: DISCONTINUED | OUTPATIENT
Start: 2024-05-09 | End: 2024-05-23

## 2024-05-09 RX ORDER — CANAGLIFLOZIN AND METFORMIN HYDROCHLORIDE 50; 500 MG/1; MG/1
1 TABLET, FILM COATED, EXTENDED RELEASE ORAL
Qty: 0 | Refills: 0 | DISCHARGE

## 2024-05-09 RX ORDER — PIOGLITAZONE HYDROCHLORIDE 15 MG/1
1 TABLET ORAL
Qty: 0 | Refills: 0 | DISCHARGE

## 2024-05-09 RX ORDER — PRAZOSIN HCL 2 MG
3 CAPSULE ORAL
Qty: 0 | Refills: 0 | DISCHARGE
Start: 2024-05-09

## 2024-05-09 RX ORDER — SODIUM CHLORIDE 9 MG/ML
1000 INJECTION INTRAMUSCULAR; INTRAVENOUS; SUBCUTANEOUS
Refills: 0 | Status: DISCONTINUED | OUTPATIENT
Start: 2024-05-09 | End: 2024-05-23

## 2024-05-09 RX ORDER — DOXAZOSIN MESYLATE 4 MG
2 TABLET ORAL AT BEDTIME
Refills: 0 | Status: DISCONTINUED | OUTPATIENT
Start: 2024-05-09 | End: 2024-05-09

## 2024-05-09 RX ORDER — ATORVASTATIN CALCIUM 80 MG/1
40 TABLET, FILM COATED ORAL AT BEDTIME
Refills: 0 | Status: DISCONTINUED | OUTPATIENT
Start: 2024-05-09 | End: 2024-05-23

## 2024-05-09 RX ORDER — INSULIN LISPRO 100/ML
VIAL (ML) SUBCUTANEOUS
Refills: 0 | Status: DISCONTINUED | OUTPATIENT
Start: 2024-05-09 | End: 2024-05-23

## 2024-05-09 RX ORDER — CLOPIDOGREL BISULFATE 75 MG/1
1 TABLET, FILM COATED ORAL
Qty: 90 | Refills: 3
Start: 2024-05-09 | End: 2025-05-03

## 2024-05-09 RX ORDER — DEXTROSE 10 % IN WATER 10 %
125 INTRAVENOUS SOLUTION INTRAVENOUS ONCE
Refills: 0 | Status: DISCONTINUED | OUTPATIENT
Start: 2024-05-09 | End: 2024-05-23

## 2024-05-09 RX ORDER — DEXTROSE 50 % IN WATER 50 %
25 SYRINGE (ML) INTRAVENOUS ONCE
Refills: 0 | Status: DISCONTINUED | OUTPATIENT
Start: 2024-05-09 | End: 2024-05-23

## 2024-05-09 RX ORDER — DEXTROSE 50 % IN WATER 50 %
15 SYRINGE (ML) INTRAVENOUS ONCE
Refills: 0 | Status: DISCONTINUED | OUTPATIENT
Start: 2024-05-09 | End: 2024-05-23

## 2024-05-09 RX ORDER — LOSARTAN POTASSIUM 100 MG/1
1 TABLET, FILM COATED ORAL
Qty: 0 | Refills: 0 | DISCHARGE

## 2024-05-09 RX ORDER — SODIUM CHLORIDE 9 MG/ML
1000 INJECTION, SOLUTION INTRAVENOUS
Refills: 0 | Status: DISCONTINUED | OUTPATIENT
Start: 2024-05-09 | End: 2024-05-23

## 2024-05-09 RX ORDER — GLUCAGON INJECTION, SOLUTION 0.5 MG/.1ML
1 INJECTION, SOLUTION SUBCUTANEOUS ONCE
Refills: 0 | Status: DISCONTINUED | OUTPATIENT
Start: 2024-05-09 | End: 2024-05-23

## 2024-05-09 RX ORDER — DOXAZOSIN MESYLATE 4 MG
1 TABLET ORAL AT BEDTIME
Refills: 0 | Status: DISCONTINUED | OUTPATIENT
Start: 2024-05-09 | End: 2024-05-09

## 2024-05-09 RX ORDER — CELECOXIB 200 MG/1
1 CAPSULE ORAL
Qty: 0 | Refills: 0 | DISCHARGE

## 2024-05-09 RX ORDER — PRAZOSIN HCL 2 MG
3 CAPSULE ORAL AT BEDTIME
Refills: 0 | Status: DISCONTINUED | OUTPATIENT
Start: 2024-05-09 | End: 2024-05-23

## 2024-05-09 RX ORDER — DEXTROSE 50 % IN WATER 50 %
12.5 SYRINGE (ML) INTRAVENOUS ONCE
Refills: 0 | Status: DISCONTINUED | OUTPATIENT
Start: 2024-05-09 | End: 2024-05-23

## 2024-05-09 RX ORDER — SODIUM CHLORIDE 9 MG/ML
250 INJECTION INTRAMUSCULAR; INTRAVENOUS; SUBCUTANEOUS ONCE
Refills: 0 | Status: DISCONTINUED | OUTPATIENT
Start: 2024-05-09 | End: 2024-05-23

## 2024-05-09 RX ORDER — ASPIRIN/CALCIUM CARB/MAGNESIUM 324 MG
81 TABLET ORAL DAILY
Refills: 0 | Status: DISCONTINUED | OUTPATIENT
Start: 2024-05-09 | End: 2024-05-23

## 2024-05-09 RX ORDER — PRAZOSIN HCL 2 MG
3 CAPSULE ORAL AT BEDTIME
Refills: 0 | Status: DISCONTINUED | OUTPATIENT
Start: 2024-05-09 | End: 2024-05-09

## 2024-05-09 RX ORDER — DULOXETINE HYDROCHLORIDE 30 MG/1
70 CAPSULE, DELAYED RELEASE ORAL DAILY
Refills: 0 | Status: DISCONTINUED | OUTPATIENT
Start: 2024-05-09 | End: 2024-05-23

## 2024-05-09 RX ORDER — SODIUM CHLORIDE 9 MG/ML
1000 INJECTION INTRAMUSCULAR; INTRAVENOUS; SUBCUTANEOUS
Refills: 0 | Status: DISCONTINUED | OUTPATIENT
Start: 2024-05-09 | End: 2024-05-09

## 2024-05-09 RX ORDER — CELECOXIB 200 MG/1
1 CAPSULE ORAL
Refills: 0 | DISCHARGE

## 2024-05-09 RX ADMIN — Medication 2: at 09:26

## 2024-05-09 RX ADMIN — Medication 4: at 12:06

## 2024-05-09 RX ADMIN — SODIUM CHLORIDE 100 MILLILITER(S): 9 INJECTION INTRAMUSCULAR; INTRAVENOUS; SUBCUTANEOUS at 10:28

## 2024-05-09 NOTE — H&P CARDIOLOGY - NSICDXFAMILYHX_GEN_ALL_CORE_FT
FAMILY HISTORY:  FH: lung cancer, father  FH: throat cancer, father     FAMILY HISTORY:  FH: lung cancer, father  FH: throat cancer, father    Father  Still living? Yes, Estimated age: Age Unknown  FHx: heart disease, Age at diagnosis: 71-80

## 2024-05-09 NOTE — ASU DISCHARGE PLAN (ADULT/PEDIATRIC) - CARE PROVIDER_API CALL
Олег Henry  Cardiovascular Disease  [address] [address] [place] [id number]  Phone: [phone]  Fax: [phone]  Follow Up Time: 2 weeks

## 2024-05-09 NOTE — H&P CARDIOLOGY - HISTORY OF PRESENT ILLNESS
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  Patient is now for [facility].  He denies any CP/dyspnea/palpitaitons/syncope.     Cards - Dr. 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  Patient is now for [facility].  He denies any CP/dyspnea/palpitations/syncope.     Cards - Dr. 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  Patient is now for [facility].  He denies any CP/dyspnea/palpitations/syncope.     Cards - Dr. TONIO Henry

## 2024-05-09 NOTE — ASU PATIENT PROFILE, ADULT - BRADEN SCORE (IF 18 OR LESS ACTIVATE SKIN INJURY RISK INCREASED GUIDELINE), MLM
HOSPITALIST ADMISSION HISTORY AND PHYSICAL    Patient: Aracelis Ibrahim Date: 2023   : 1941    82 year old female 2023     Primary Care Physician:  Kathy Grover PA-C  Admitting Physician: Ki Gage MD   Consultants:   IP Consult Orders (From admission, onward)     Start     Ordered    23 1640  Inpatient consult to Infectious Diseases  ONE TIME        Provider:  (Not yet assigned)    23 1639                Hospital Medicine Team recommends Aracelis Ibrahim be admitted as an INPATIENT to the medical unit/ICU. The expected LOS exceeds 2 midnights. Reason(s) for INPATIENT CARE include pneumonia with failed abx therapy . Based on severity of presenting sx, co-morbidities, and lab/imaging, this patient has an increased risk of adverse outcomes.        CC: SOB, Cough, Weakness     HPI:     Aracelis Ibrahim is an 82 year old female with PMH including Afib on eliquis, PE, COPD on chronic 3 L NC O2, CHF, HTN, GERD, hx of lung cancer NSCLC/squamous cell S/P lobectomy presenting with SOB, cough, weakness. Patient was recently admitted and treated for CAP/COPD exacebation from 10/05/2023-10/14/2023. She was treated with IV cefepime and azitrhomyocin, steroid taper. She was discharged to SNF.  Now at home, patient has had weakness, no appetite, productive cough for the last 10 days, nausea, worsening SOB. She denies recent travel, sick contacts.   Thorough workup in the ED including a BMP reveals a hypokalemia of 3.0, AG 6, magnesium 1.4, troponin 9, lactic acid 0.7, no leukocytosis, H&H 9.7/29.5. Ddimer 1.31, TSH 1.48. CXR showing Chronic obstructive lung changes with possible emphysema.  2. Persistent, slightly increased opacity in the inferior right chest with a slightly larger right effusion. Clinically differentiate increasing compressive atelectasis versus developing infiltrate.  CT PE showing Multifocal patchy consolidations throughout both lungs suggesting  worsening multifocal  pneumonia. This is especially prominent in the left lower lobe and lingula. 3. Postoperative changes are seen from right lower lobectomy. Moderate right pleural effusion with consolidative changes in the right lower lobe are noted. This is likely combination of atelectasis and additional area of focal consolidation. 4. Soft tissue fullness in the right infrahilar region abutting the posterior mediastinum is again identified. It measures minimally larger than previous studies at 4.2 x 2.5 cm. It is likely related to postsurgical changes.      Wearing a state DNR band.     PAST MEDICAL HISTORY  Past Medical History:   Diagnosis Date   • Alcohol abuse 11/05/2020   • Chronic respiratory failure with hypoxia (CMD) 12/31/2022    On 3 L NC.   • COPD (chronic obstructive pulmonary disease) with emphysema (CMD) 05/17/2017   • COPD, severe (CMD) 05/17/2017    Managed through [facility]-> Dr. Foss on 3 L nasal cannula   • Diastolic CHF due to valvular disease (CMD) 06/27/2017   • Essential (primary) hypertension    • Gastroesophageal reflux disease without esophagitis 11/05/2020   • H/O: lung cancer 1990 S/P RL lobectomy,RT.  03/24/2021NSCLC/squamous cell S/P SBRT, 05/03/2021 05/17/2017    [id number]/ P RL lobectomy f/b adjuvant RT .3/24/21, IR biopsy of hypermetabolic pulmonary nodularity:+ non-small-cell carcinoma squamous cell carcinoma.  4/07/21 CT C/A/P: progressing residual dis.   4/07/21, MRI brain no evidence of intracranial metastasis.5/03/21, 05/04/2021, SBRT under care of Dr. Melendez.8/26/23 Dr. Parry-Continue close clinical radiological monitoring. Lower lobe 1995; s/p ri   • Heart valve disorder 05/17/2017    Mild MR/TR, AVR - per [facility] medical record.  9/11/23 ECHO: Normal LV size & SF EF 55 %, GLS -17.4 %. LVEF 55%,Mod. P HTN, RVSP 49 mmHg.Normal RV size & SF Prosthetic AV allan gradient 9 mmHg.Normal bioprosthetic A Mild MV  regurgitation. No pericardial effusion.   • High cholesterol    • History Chronic  Diastolic CHF due to valvular disease (CMD) 06/27/2017    Chronic diastolic CHF.  Moderate P. HTN> 9/11/23 ECHO: Normal LV size & SF EF 55 %, GLS -17.4 %. LVEF 55%,Mod. P HTN, RVSP 49 mmHg.Normal RV size & SF Prosthetic AV allan gradient 9 mmHg.Normal bioprosthetic A Mild MV  regurgitation. No pericardial effusion.   • History of GI bleed 10/06/2020   • History of MRSA infection 05/17/2017    Positive culture 09/2009   • Hx Acute respiratory failure with hypoxia (CMD) 05/12/2022   • Hx Cervical cancer S/P hysterectomy age 34     hysterectomy age 34   • Hx COPD exacerbation (CMD) 12/19/2022   • Hx GI bleeding 07/24/2012   • Hx Human metapneumovirus (hMPV) pneumonia 05/15/2022   • Hx MRSA (methicillin resistant Staphylococcus aureus)     stated bad infection after heart surgery required months of hyperbaric treatments    • Hx Nonischemic cardiomyopathy (CMD) 05/30/2016     Chronic diastolic CHF.  Moderate P. HTN> 9/11/23 ECHO: Normal LV size & SF EF 55 %, GLS -17.4 %. LVEF 55%,Mod. P HTN, RVSP 49 mmHg.Normal RV size & SF Prosthetic AV allan gradient 9 mmHg.Normal bioprosthetic A Mild MV  regurgitation. No pericardial effusion.   • Hx Pneumonia due to infectious organism, unspecified laterality, unspecified part of lung 06/20/2022   • Hx Sigmoid diverticulitis 05/17/2017   • Hx SVT (supraventricular tachycardia) 01/24/2023    Started on Cardizem   • Hx Thrombocytopenia (CMD) 05/17/2017    Mild, intermittent   • Hyperlipemia 06/27/2017   • Hypertension 05/17/2017   • Malignant neoplasm (CMD) 1998    right lower lobectomy for lung carcinoma   • Moderate Pulmonary hypertension (CMD) 05/17/2017      Moderate P. HTN> 9/11/23 ECHO: Normal LV size & SF EF 55 %, GLS -17.4 %. LVEF 55%,Mod. P HTN, RVSP 49 mmHg.Normal RV size & SF Prosthetic AV allan gradient 9 mmHg.Normal bioprosthetic A Mild MV  regurgitation. No pericardial effusion.   • Nocturnal hypoxemia 05/22/2019    On 2L of O2 at night   • Non-small cell lung cancer (NSCLC)  (CMD) 03/29/2021    [id number]/ P RL lobectomy f/b adjuvant RT .3/24/21, IR biopsy of hypermetabolic pulmonary nodularity:+ non-small-cell carcinoma squamous cell carcinoma.  4/07/21 CT C/A/P: progressing residual dis.   4/07/21, MRI brain no evidence of intracranial metastasis.5/03/21, 05/04/2021, SBRT under care of Dr. Melendez.8/26/23 Dr. Parry-Continue close clinical radiological monitoring.    • On home oxygen therapy     3L prn activity; 3L continuous at night   • Pneumonia    • Presence of prosthetic heart valve S/P bioprosthetic AVR 08/01/2012    Chronic diastolic CHF.  Moderate P. HTN> 9/11/23 ECHO: Normal LV size & SF EF 55 %, GLS -17.4 %. LVEF 55%,Mod. P HTN, RVSP 49 mmHg.Normal RV size & SF Prosthetic AV allan gradient 9 mmHg.Normal bioprosthetic A Mild MV  regurgitation. No pericardial effusion.   • Tobacco abuse, in remission 05/17/2017       PAST SURGICAL HISTORY  Past Surgical History:   Procedure Laterality Date   • Aortic valve replacement     • Bronchoscopy,transbronch biopsy  12/05/2022   • Cardiac catherization     • Cardiac valve replacement     • Echocardiogram  08/22/2018   • Ir thoracentesis  04/21/2023   • Lung lobectomy Right 1998   • Total abdominal hysterectomy      age 34 cervical cancer, ovaries intact   • Us carotid duplex bilateral  05/02/2018       SOCIAL HISTORY  Denies tobacco abuse   Drinks 1 glass of wine/week  No recreational drug use     FAMILY HISTORY  Family History   Problem Relation Age of Onset   • Cancer Mother    • Cancer Father    • Cancer Sister         Thyroid   • Asthma Brother        MEDICATIONS  (Not in a hospital admission)      ALLERGIES  ALLERGIES:   Allergen Reactions   • Ciprofloxacin DIZZINESS   • Lisinopril Cough   • Maple   (Food Or Environmental) Other (See Comments)     Maple leaves only. Runny nose and sneezing   • Mold   (Environmental) Other (See Comments)     Runny Nose and sneezing       ROS:      General: Denies fever, chills,  unintentional weight  loss or weight gain  Endorses fatigue or weakness, decreased appetite.   Skin: Denies rash or unexplained bruising  Eyes: Denies acute visual changes or discharge   HENT:: Denies epistaxis, facial pain, nasal congestion.   Respiratory:  Endorses cough, SOB, Denies orthopnea.   Cardiovascular: Denies chest pain, palpitations, fast heart rate or acute/worsening lower extremity edema  Gastrointestinal: Denies abdominal pain, vomiting, constipation, diarrhea, Denies change in the bowel habits. Endorses nausea  Genitourinary: Denies urgency, frequency, dysuria or hematuria.   Neurologic: Denies headache, speech disturbance, trouble with balance or coordination, loss of memory and confusion  Musculoskeletal: Denies acute/worsening neck or back pain. Denies any change/acute joint pains  Psychiatric: Denies anxiety. Denies symptoms of depression     PHYSICAL EXAM:    VITAL SIGNS:   Visit Vitals  BP (!) 164/82   Pulse (!) 101   Temp 98.6 °F (37 °C) (Oral)   Resp (!) 24   Ht 5' 1\" (1.549 m)   Wt 43 kg (94 lb 14.4 oz)   SpO2 99%   BMI 17.93 kg/m²   ;  Body mass index is 17.93 kg/m².  GENERAL APPEARANCE:  welderly chronically ill appearing No apparent distress. Alert and orientated x 3.  SKIN: Normal color, normal texture, normal turgor, no skin rashes, no ulcers  HEENT: Normocephalic. PERRL. EOMI.  Anicteric sclera. Oropharynx moist without thrush, ulcers or erythema   NECK: Supple and non-tender. No carotid bruits.   CHEST: Normal respiratory excursion, respiratory effort is not labored and no chest wall discomfort to palpation, on 3L NC  LUNGS: tight in bilateral fields with poor aeration, minimal wheezing and rhonchi  HEART: tachycardic, no palpable heaves or thrills, S1 and S2 normal, no murmurs  ABDOMEN: Soft, normoactive bowel sounds, non-tender and without guarding or rebound. No masses. No hepatomegaly or splenomegaly   and Rectal: Deferred  EXTREMITIES: No clubbing or cyanosis. No edema. No calf asymmetry,  tenderness or palpable cords.  Vascular exam reveals 2+ distal pulses  MUSCULOSKELETAL and NEUROLOGIC: Grossly normal ROM of all 4 extremities.  Muscle tone and motor strength normal/appropriate for age. No tremors noted. Cranial nerves 2 through 12 grossly normal.   PSYCHIATRIC: Cooperative with appropriate affect. No gross deficit of recent or remote memory. Appropriate insight.    LABS AND IMAGING:  Recent Results (from the past 24 hour(s))   ECG    Collection Time: 11/06/23 12:06 PM   Result Value Ref Range    Systolic Blood Pressure 145     Diastolic Blood Pressure 65     Ventricular Rate EKG/Min (BPM) 106     Atrial Rate (BPM) 106     NE-Interval (MSEC) 178     QRS-Interval (MSEC) 84     QT-Interval (MSEC) 366     QTc 486     P Axis (Degrees) 56     R Axis (Degrees) 43     T Axis (Degrees) 79     REPORT TEXT       Sinus tachycardia  Nonspecific T wave abnormality  Abnormal ECG  When compared with ECG of  05-OCT-2023 04:46,  No significant change was found  Confirmed by BERNARD BASILIO MD ([id number]) on 11/6/2023 3:02:26 PM     Comprehensive Metabolic Panel    Collection Time: 11/06/23 12:11 PM   Result Value Ref Range    Fasting Status      Sodium 135 135 - 145 mmol/L    Potassium 3.0 (L) 3.4 - 5.1 mmol/L    Chloride 97 97 - 110 mmol/L    Carbon Dioxide 35 (H) 21 - 32 mmol/L    Anion Gap 6 (L) 7 - 19 mmol/L    Glucose 116 (H) 70 - 99 mg/dL    BUN 15 6 - 20 mg/dL    Creatinine 0.48 (L) 0.51 - 0.95 mg/dL    Glomerular Filtration Rate >90 >=60    BUN/Cr 31 (H) 7 - 25    Calcium 8.8 8.4 - 10.2 mg/dL    Bilirubin, Total 0.2 0.2 - 1.0 mg/dL    GOT/AST 12 <=37 Units/L    GPT/ALT 12 <64 Units/L    Alkaline Phosphatase 71 45 - 117 Units/L    Albumin 2.4 (L) 3.6 - 5.1 g/dL    Protein, Total 6.3 (L) 6.4 - 8.2 g/dL    Globulin 3.9 2.0 - 4.0 g/dL    A/G Ratio 0.6 (L) 1.0 - 2.4   TROPONIN I, HIGH SENSITIVITY    Collection Time: 11/06/23 12:11 PM   Result Value Ref Range    Troponin I, High Sensitivity 9 <52 ng/L   Lipase     22 Collection Time: 11/06/23 12:11 PM   Result Value Ref Range    Lipase 45 15 - 77 Units/L   Magnesium    Collection Time: 11/06/23 12:11 PM   Result Value Ref Range    Magnesium 1.4 (L) 1.7 - 2.4 mg/dL   D Dimer, Quantitative    Collection Time: 11/06/23 12:11 PM   Result Value Ref Range    D Dimer, Quantitative 1.31 (H) <0.57 mg/L (FEU)   CBC with Automated Differential (performable only)    Collection Time: 11/06/23 12:11 PM   Result Value Ref Range    WBC 7.6 4.2 - 11.0 K/mcL    RBC 3.18 (L) 4.00 - 5.20 mil/mcL    HGB 9.7 (L) 12.0 - 15.5 g/dL    HCT 29.5 (L) 36.0 - 46.5 %    MCV 92.8 78.0 - 100.0 fl    MCH 30.5 26.0 - 34.0 pg    MCHC 32.9 32.0 - 36.5 g/dL    RDW-CV 12.9 11.0 - 15.0 %    RDW-SD 43.8 39.0 - 50.0 fL     140 - 450 K/mcL    NRBC 0 <=0 /100 WBC    Neutrophil, Percent 83 %    Lymphocytes, Percent 4 %    Mono, Percent 10 %    Eosinophils, Percent 2 %    Basophils, Percent 0 %    Immature Granulocytes 1 %    Absolute Neutrophils 6.4 1.8 - 7.7 K/mcL    Absolute Lymphocytes 0.3 (L) 1.0 - 4.0 K/mcL    Absolute Monocytes 0.8 0.3 - 0.9 K/mcL    Absolute Eosinophils  0.1 0.0 - 0.5 K/mcL    Absolute Basophils 0.0 0.0 - 0.3 K/mcL    Absolute Immature Granulocytes 0.1 0.0 - 0.2 K/mcL   Thyroid Stimulating Hormone Reflex    Collection Time: 11/06/23 12:11 PM   Result Value Ref Range    TSH 1.480 0.350 - 5.000 mcUnits/mL   Lactic Acid Venous With Reflex    Collection Time: 11/06/23 12:11 PM   Result Value Ref Range    Lactate, Venous 0.7 0.0 - 2.0 mmol/L   Procalcitonin    Collection Time: 11/06/23 12:11 PM   Result Value Ref Range    Procalcitonin <0.05 <=0.09 ng/mL   Urinalysis & Reflex Microscopy With Culture If Indicated    Collection Time: 11/06/23  2:24 PM   Result Value Ref Range    COLOR, URINALYSIS Yellow     APPEARANCE, URINALYSIS Clear     GLUCOSE, URINALYSIS Negative Negative mg/dL    BILIRUBIN, URINALYSIS Negative Negative    KETONES, URINALYSIS Trace (A) Negative mg/dL    SPECIFIC GRAVITY,  URINALYSIS 1.020 1.005 - 1.030    OCCULT BLOOD, URINALYSIS Negative Negative    PH, URINALYSIS 7.0 5.0 - 7.0    PROTEIN, URINALYSIS Negative Negative mg/dL    UROBILINOGEN, URINALYSIS 0.2 0.2, 1.0 mg/dL    NITRITE, URINALYSIS Negative Negative    LEUKOCYTE ESTERASE, URINALYSIS Negative Negative      @cardiaclab@  INR (no units)   Date Value   09/30/2023 1.0       IMAGING:  CTA CHEST PULMONARY EMBOLISM    Result Date: 11/6/2023  EXAM: CTA CHEST PULMONARY EMBOLISM INDICATION: 82-year-old female with a history of lung cancer post right lobectomy. Patient also has history of COPD. Patient is on blood thinners for atrial fibrillation and valve replacement. Patient with shortness of breath. COMPARISON: 10/27/2023 and 10/5/2023. TECHNIQUE: CT angiographic images through the chest were performed with particular attention made to the pulmonary arterial system. 58 mL Omnipaque 350 contrast was given for the study. MIP reformatted images were acquired. This CT exam was performed using one or more of the following dose reduction techniques: Automated exposure control, adjustment of the mA and/or KV according to patient size, and/or use of iterative reconstructive technique. FINDINGS: There is no evidence of pulmonary embolus. Heart size is normal. Patient's had prior aortic valve replacement. There is extensive coronary artery calcification. Thoracic aorta is normal in course and caliber. The central pulmonary arteries are enlarged which can be seen in pulmonary artery hypertension. This appearance is stable. There is no axillary or supraclavicular adenopathy. There is no significant mediastinal or hilar adenopathy. Soft tissue fullness in the right perihilar region abutting the posterior mediastinum is again identified and overall similar in appearance measuring approximately 4.2 x 2.5 cm (image 149 of series 2). Postsurgical changes are seen from right lower lobectomy. There are multifocal consolidative changes within the  lungs. This has progressed in the left lower lobe, left middle lobe, and posterior aspect of the right upper lobe compared to the study of 10/27/2023. The findings suggest multifocal pneumonia and could include aspiration pneumonia. Consolidative changes in the right lower lobe is likely a combination of atelectasis with additional infiltrate. Mucous plugging in the left lower lobe is identified. There is no pneumothorax. There is a moderate right pleural effusion and trace left pleural effusion. The right pleural effusion was seen previously. Limited images through the upper abdomen are unremarkable. There is mild anasarca. There are compression fractures of T6 and T7 which are stable in appearance compared to the patient's previous study. There is no acute fracture.     IMPRESSION: 1. No evidence of pulmonary embolus. 2. Multifocal patchy consolidations throughout both lungs suggesting worsening multifocal pneumonia. This is especially prominent in the left lower lobe and lingula. 3. Postoperative changes are seen from right lower lobectomy. Moderate right pleural effusion with consolidative changes in the right lower lobe are noted. This is likely combination of atelectasis and additional area of focal consolidation. 4. Soft tissue fullness in the right infrahilar region abutting the posterior mediastinum is again identified. It measures minimally larger than previous studies at 4.2 x 2.5 cm. It is likely related to postsurgical changes. Electronically Signed by: Deisy Paniagua MD Signed on: 11/6/2023 3:43 PM Created on Workstation ID: [id number] Signed on Workstation ID: [id number]    XR CHEST AP OR PA    Result Date: 11/6/2023  HISTORY: SOB, fatigue, weakness. Previous chest CT showed emphysema, right paraspinal soft tissue density, right lobectomy, and right pleural effusion EXAM: AP chest x-ray COMPARISON: September 30, 2023; CT chest from October 27, 2023. FINDINGS: Redemonstrated is dense airspace disease  in the right lower lobe with a right-sided effusion that extends into the minor fissure. No new infiltrate or focal airspace disease is present in the left chest. Lung volumes remain large. Underlying changes of obstructive lung disease/emphysema are present. The heart size and pulmonary vasculature are normal. The mediastinal contour is normal. There is no pneumothorax.     IMPRESSION: 1. Chronic obstructive lung changes with possible emphysema. 2. Persistent, slightly increased opacity in the inferior right chest with a slightly larger right effusion. Clinically differentiate increasing compressive atelectasis versus developing infiltrate. Electronically Signed by: Chao Angel MD Signed on: 11/6/2023 2:05 PM Created on Workstation ID: [id number] Signed on Workstation ID: [id number]      EKG (MOST RECENT):   Results for orders placed or performed during the hospital encounter of 11/06/23   ECG   Result Value Ref Range    Systolic Blood Pressure 145     Diastolic Blood Pressure 65     Ventricular Rate EKG/Min (BPM) 106     Atrial Rate (BPM) 106     VA-Interval (MSEC) 178     QRS-Interval (MSEC) 84     QT-Interval (MSEC) 366     QTc 486     P Axis (Degrees) 56     R Axis (Degrees) 43     T Axis (Degrees) 79     REPORT TEXT       Sinus tachycardia  Nonspecific T wave abnormality  Abnormal ECG  When compared with ECG of  05-OCT-2023 04:46,  No significant change was found  Confirmed by CHETNA STUART, [facility] ([id number]) on 11/6/2023 3:02:26 PM         _________________________________________________________________________    A/P:    Aracelis Ibrahim is a 82 year old female who was admitted on 11/6/2023 presenting with     HCAP  Sepsis without Shock  -lactic acid 0.7, procalcitonin pending   -CXR & CT showing recurrent pneumonia   -recently on IV cefepime and azithromyocin  -start Vanc and zosyn 11/06  -check legionella, strep, RPP, COVID/RSV/Flu, sputum culture  -blood cultures    -ID consult     COPD  Chronic Hypoxemia  on 3L NC   -IV solumedrol 40mg Q12 hours   -albuterol, duonebs, PTA rofilumast   -IS, duonebs     Generalized Weakness  Protein Malnutrition   -Dietician, PT/OT    Hypokalemia   -3.0 on admission, replenish and check in AM  Hypomagnesemia   -1.4 on admission, replenish and check in AM      Chronic medical problems  Afib  -sinus tachy, continue eliquis   -diltiazem   GERD   -PPI  Depression  -PTA escitalopram     Past Medical History:   Diagnosis Date   • Alcohol abuse 11/05/2020   • Chronic respiratory failure with hypoxia (CMD) 12/31/2022    On 3 L NC.   • COPD (chronic obstructive pulmonary disease) with emphysema (CMD) 05/17/2017   • COPD, severe (CMD) 05/17/2017    Managed through [facility]-> Dr. Foss on 3 L nasal cannula   • Diastolic CHF due to valvular disease (CMD) 06/27/2017   • Essential (primary) hypertension    • Gastroesophageal reflux disease without esophagitis 11/05/2020   • H/O: lung cancer 1990 S/P RL lobectomy,RT.  03/24/2021NSCLC/squamous cell S/P SBRT, 05/03/2021 05/17/2017    [id number]/ P RL lobectomy f/b adjuvant RT .3/24/21, IR biopsy of hypermetabolic pulmonary nodularity:+ non-small-cell carcinoma squamous cell carcinoma.  4/07/21 CT C/A/P: progressing residual dis.   4/07/21, MRI brain no evidence of intracranial metastasis.5/03/21, 05/04/2021, SBRT under care of Dr. Melendez.8/26/23 Dr. Parry-Continue close clinical radiological monitoring. Lower lobe 1995; s/p ri   • Heart valve disorder 05/17/2017    Mild MR/TR, AVR - per [facility] medical record.  9/11/23 ECHO: Normal LV size & SF EF 55 %, GLS -17.4 %. LVEF 55%,Mod. P HTN, RVSP 49 mmHg.Normal RV size & SF Prosthetic AV allan gradient 9 mmHg.Normal bioprosthetic A Mild MV  regurgitation. No pericardial effusion.   • High cholesterol    • History Chronic Diastolic CHF due to valvular disease (CMD) 06/27/2017    Chronic diastolic CHF.  Moderate P. HTN> 9/11/23 ECHO: Normal LV size & SF EF 55 %, GLS -17.4 %. LVEF 55%,Mod. P HTN, RVSP 49  mmHg.Normal RV size & SF Prosthetic AV allan gradient 9 mmHg.Normal bioprosthetic A Mild MV  regurgitation. No pericardial effusion.   • History of GI bleed 10/06/2020   • History of MRSA infection 05/17/2017    Positive culture 09/2009   • Hx Acute respiratory failure with hypoxia (CMD) 05/12/2022   • Hx Cervical cancer S/P hysterectomy age 34     hysterectomy age 34   • Hx COPD exacerbation (CMD) 12/19/2022   • Hx GI bleeding 07/24/2012   • Hx Human metapneumovirus (hMPV) pneumonia 05/15/2022   • Hx MRSA (methicillin resistant Staphylococcus aureus)     stated bad infection after heart surgery required months of hyperbaric treatments    • Hx Nonischemic cardiomyopathy (CMD) 05/30/2016     Chronic diastolic CHF.  Moderate P. HTN> 9/11/23 ECHO: Normal LV size & SF EF 55 %, GLS -17.4 %. LVEF 55%,Mod. P HTN, RVSP 49 mmHg.Normal RV size & SF Prosthetic AV allan gradient 9 mmHg.Normal bioprosthetic A Mild MV  regurgitation. No pericardial effusion.   • Hx Pneumonia due to infectious organism, unspecified laterality, unspecified part of lung 06/20/2022   • Hx Sigmoid diverticulitis 05/17/2017   • Hx SVT (supraventricular tachycardia) 01/24/2023    Started on Cardizem   • Hx Thrombocytopenia (CMD) 05/17/2017    Mild, intermittent   • Hyperlipemia 06/27/2017   • Hypertension 05/17/2017   • Malignant neoplasm (CMD) 1998    right lower lobectomy for lung carcinoma   • Moderate Pulmonary hypertension (CMD) 05/17/2017      Moderate P. HTN> 9/11/23 ECHO: Normal LV size & SF EF 55 %, GLS -17.4 %. LVEF 55%,Mod. P HTN, RVSP 49 mmHg.Normal RV size & SF Prosthetic AV allan gradient 9 mmHg.Normal bioprosthetic A Mild MV  regurgitation. No pericardial effusion.   • Nocturnal hypoxemia 05/22/2019    On 2L of O2 at night   • Non-small cell lung cancer (NSCLC) (CMD) 03/29/2021    [id number]/ P RL lobectomy f/b adjuvant RT .3/24/21, IR biopsy of hypermetabolic pulmonary nodularity:+ non-small-cell carcinoma squamous cell carcinoma.  4/07/21 CT  C/A/P: progressing residual dis.   4/07/21, MRI brain no evidence of intracranial metastasis.5/03/21, 05/04/2021, SBRT under care of Dr. Melendez.8/26/23 Dr. Parry-Continue close clinical radiological monitoring.    • On home oxygen therapy     3L prn activity; 3L continuous at night   • Pneumonia    • Presence of prosthetic heart valve S/P bioprosthetic AVR 08/01/2012    Chronic diastolic CHF.  Moderate P. HTN> 9/11/23 ECHO: Normal LV size & SF EF 55 %, GLS -17.4 %. LVEF 55%,Mod. P HTN, RVSP 49 mmHg.Normal RV size & SF Prosthetic AV allan gradient 9 mmHg.Normal bioprosthetic A Mild MV  regurgitation. No pericardial effusion.   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  This case was discussed with my supervising physician Dr. Ki Gage MD who agrees with the above assessment and course of action.     Signed  Pratik Woody PA-C  [facility]  11/6/2023  5:45 PM     (Contact by secure chat)   Available on [employer] for any questions.   Please call for urgent matters.  After 7pm please page the Hospitalist on-call.

## 2024-05-09 NOTE — ASU PATIENT PROFILE, ADULT - NS PRO ABUSE SCREEN AFRAID ANYONE YN
Patient came in for  PT check, it was 2.1. Dr. Tamia Catalan informed, he said no change to med telling patient to come back in 1 month for recheck.  Verbal order received for Dr Tamia Catalan to check PT.
no

## 2024-05-09 NOTE — ASU PREOP CHECKLIST - ALLERGIES REVIEWED
PT called requesting to speak with Mary Brito. PT says she's worried about the amoun t of diarrhea ( couple x's a day) and gas she's having. States she has eaten food that is questionable.I suggested that maybe it just needs to pass through her system, as she is not having any pains, bad cramps, or blood present. States she would feel better talking to someone. PT has apt with Mary the 19th.   done

## 2024-05-09 NOTE — H&P CARDIOLOGY - NSICDXPASTMEDICALHX_GEN_ALL_CORE_FT
PAST MEDICAL HISTORY:  Diabetes Mellitus Type II     Gallbladder calculus without cholecystitis and no obstruction     Hypercholesterolemia     Hypertension      PAST MEDICAL HISTORY:  Diabetes Mellitus Type II     Gallbladder calculus without cholecystitis and no obstruction     Hemochromatosis     Hypercholesterolemia     Hypertension     Lung nodules     NIRMAL on CPAP

## 2024-05-24 PROBLEM — R91.8 OTHER NONSPECIFIC ABNORMAL FINDING OF LUNG FIELD: Chronic | Status: ACTIVE | Noted: 2024-05-09

## 2024-05-24 PROBLEM — E83.119 HEMOCHROMATOSIS, UNSPECIFIED: Chronic | Status: ACTIVE | Noted: 2024-05-09

## 2024-05-24 PROBLEM — G47.33 OBSTRUCTIVE SLEEP APNEA (ADULT) (PEDIATRIC): Chronic | Status: ACTIVE | Noted: 2024-05-09

## 2024-05-30 ENCOUNTER — APPOINTMENT (OUTPATIENT)
Age: 54
End: 2024-05-30
Payer: COMMERCIAL

## 2024-05-30 VITALS
HEIGHT: 72 IN | HEART RATE: 92 BPM | SYSTOLIC BLOOD PRESSURE: 146 MMHG | WEIGHT: 255 LBS | BODY MASS INDEX: 34.54 KG/M2 | DIASTOLIC BLOOD PRESSURE: 84 MMHG

## 2024-05-30 DIAGNOSIS — Z96.651 PAIN DUE TO INTERNAL ORTHOPEDIC PROSTHETIC DEVICES, IMPLANTS AND GRAFTS, INITIAL ENCOUNTER: ICD-10-CM

## 2024-05-30 DIAGNOSIS — T84.84XA PAIN DUE TO INTERNAL ORTHOPEDIC PROSTHETIC DEVICES, IMPLANTS AND GRAFTS, INITIAL ENCOUNTER: ICD-10-CM

## 2024-05-30 PROCEDURE — 73562 X-RAY EXAM OF KNEE 3: CPT | Mod: 26,RT

## 2024-05-30 PROCEDURE — 99213 OFFICE O/P EST LOW 20 MIN: CPT

## 2024-05-30 RX ORDER — CLOPIDOGREL 75 MG/1
TABLET, FILM COATED ORAL
Refills: 0 | Status: ACTIVE | COMMUNITY

## 2024-05-30 NOTE — PHYSICAL EXAM
[LE] : Sensory: Intact in bilateral lower extremities [ALL] : dorsalis pedis, posterior tibial, femoral, popliteal, and radial 2+ and symmetric bilaterally [de-identified] : GENERAL APPEARANCE: Well nourished and hydrated, pleasant, alert, and oriented x 3. Appears their stated age.  HEENT: Normocephalic, extraocular eye motion intact. Nasal septum midline. Oral cavity clear. External auditory canal clear.  RESPIRATORY: Breath sounds clear and audible in all lobes. No wheezing, No accessory muscle use.  CARDIOVASCULAR: No apparent abnormalities. No lower leg edema. No varicosities. Pedal pulses are palpable.  NEUROLOGIC: Sensation is normal, no muscle weakness in the upper or lower extremities.  DERMATOLOGIC: No apparent skin lesions, moist, warm, no rash.  SPINE: Cervical spine appears normal and moves freely; thoracic spine appears normal and moves freely; lumbosacral spine appears normal and moves freely, normal, nontender.  MUSCULOSKELETAL: Hands, wrists, and elbows are normal and move freely, shoulders are normal and move freely.      [de-identified] : Musculoskeletal:. Right knee exam shows healed incision with no sign of infection. ROM 0-105 [de-identified] : Three-view imaging of the right knee shows implants in good positioning with no evidence of loosening or wear

## 2024-05-30 NOTE — HISTORY OF PRESENT ILLNESS
[de-identified] : This is a 53 year old male here for follow up of right knee pain. He had RTKA in 2021 at [facility]. He had had prior aspiration which was positive for pseudogout. He had repeat aspiration at [place] with his surgeon which was negative for any infection. he continues to have the sensation of instability in the right knee. He did have MRi which showed well fixed implants without complication. He continues to have swelling of the right knee. The pt takes 100 mg Celebrex in a day. He continues to have pain in the right knee that limits him from doing his normal activities. The pt is losing his quality of life and is unable to enjoy his normal activities. 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  He feels the knee is unstable and is going to give out on him.  He walks with a cane.He was last seen in March to discuss revision surgery of his right knee which he would like to proceed with an December.  He comes in today for follow-up he was recently on vacation and reported some worsening soreness and swelling of the right knee after the trip.  He has also been having pain in the right calf that has been worse with his exercises and when he was having medical massage. [Worsening] : worsening [6] : a current pain level of 6/10 [3] : a minimum pain level of 3/10 [9] : a maximum pain level of 9/10

## 2024-05-30 NOTE — DISCUSSION/SUMMARY
[Medication Risks Reviewed] : Medication risks reviewed [Surgical risks reviewed] : Surgical risks reviewed [de-identified] : 54 y/o M pt is s/p painful right TKA from 2021. The nature of his condition and treatment options were discussed in detail. Patient comes in today for after he was last seen in March to discuss total knee revision surgery which she would like to proceed with in December.  He came in today just for follow-up for increasing pain and swelling in the knee and calf after recent vacation.  I have ordered a Doppler of the lower extremity to rule out a DVT although we discussed this is unlikely.  He will follow-up in 3 months to again discuss revision surgery with Dr. Yuen and schedule for December  The patient is a 54 year year old individual with a failing right TKA. Based upon the patient's continued symptoms and failure to respond to conservative treatment (including HA injections, cortisone injections, over the counter medications, and PT) I have recommended a revision right total knee arthroplasty for this patient. A long discussion took place with the patient describing what a total joint replacement is and what the procedure would entail. A total knee arthroplasty model, similar to the implant that will be used during the operation, was utilized to demonstrate and to discuss the various bearing surfaces of the implants. The hospitalization and post-operative care and rehabilitation were also discussed. The use of perioperative antibiotics and DVT prophylaxis were discussed. The risk, benefits and alternatives to a surgical intervention were discussed at length with the patient. The patient was also advised of risks related to the medical comorbidities, elevated body mass index (BMI), and smoking where applicable. We discussed how to reduce modifiable risk factors and encouraged smoking cessation were applicable. A lengthy discussion took place to review the most common complications including but not limited to: deep vein thrombosis, pulmonary embolus, heart attack, stroke, infection, wound breakdown, numbness, damage to nerves, tendon, muscles, arteries or other blood vessels, death and other possible complications from anesthesia. The patient was told that we will take steps to minimize these risks by using sterile technique, antibiotics and DVT prophylaxis when appropriate and follow the patient postoperatively in the office setting to monitor progress. The possibility of recurrent pain, no improvement in pain and actual worsening of pain were also discussed with the patient. The discharge plan of care focused on the patient going home following surgery. The patient was encouraged to make the necessary arrangements to have someone stay with them when they are discharged home. Following discharge, a home care nurse will visit the patient. The home care nurse will open your home care case and request home physical therapy services. Home physical therapy will commence following discharge provided it is appropriate and covered by the health insurance benefit plan.  The benefits of surgery were discussed with the patient including the potential for improving his/her current clinical condition through operative intervention. Alternatives to surgical intervention including continued conservative management were also discussed in detail. All questions were answered to the satisfaction of the patient. The treatment plan of care, as well as a model of a total knee arthroplasty equivalent to the one that will be used for their total joint replacement, was shared with the patient. The patient agreed to the plan of care as well as the use of implants in their total joint replacement.  unable to perform

## 2024-06-05 ENCOUNTER — OFFICE (OUTPATIENT)
Dept: URBAN - METROPOLITAN AREA CLINIC 6 | Facility: CLINIC | Age: 54
Setting detail: OPHTHALMOLOGY
End: 2024-06-05
Payer: COMMERCIAL

## 2024-06-05 DIAGNOSIS — H25.13: ICD-10-CM

## 2024-06-05 DIAGNOSIS — E11.3293: ICD-10-CM

## 2024-06-05 PROCEDURE — 92250 FUNDUS PHOTOGRAPHY W/I&R: CPT | Performed by: OPHTHALMOLOGY

## 2024-06-05 PROCEDURE — 92014 COMPRE OPH EXAM EST PT 1/>: CPT | Performed by: OPHTHALMOLOGY

## 2024-06-05 ASSESSMENT — CONFRONTATIONAL VISUAL FIELD TEST (CVF)
OD_FINDINGS: FULL
OS_FINDINGS: FULL

## 2024-06-12 ENCOUNTER — OFFICE (OUTPATIENT)
Dept: URBAN - METROPOLITAN AREA CLINIC 6 | Facility: CLINIC | Age: 54
Setting detail: OPHTHALMOLOGY
End: 2024-06-12
Payer: COMMERCIAL

## 2024-06-12 DIAGNOSIS — H50.21: ICD-10-CM

## 2024-06-12 DIAGNOSIS — H50.10: ICD-10-CM

## 2024-06-12 DIAGNOSIS — H25.13: ICD-10-CM

## 2024-06-12 PROCEDURE — 99214 OFFICE O/P EST MOD 30 MIN: CPT | Performed by: OPHTHALMOLOGY

## 2024-06-12 PROCEDURE — 92060 SENSORIMOTOR EXAMINATION: CPT | Performed by: OPHTHALMOLOGY

## 2024-06-12 ASSESSMENT — CONFRONTATIONAL VISUAL FIELD TEST (CVF)
OD_FINDINGS: FULL
OS_FINDINGS: FULL

## 2024-06-26 ENCOUNTER — OFFICE (OUTPATIENT)
Dept: URBAN - METROPOLITAN AREA CLINIC 6 | Facility: CLINIC | Age: 54
Setting detail: OPHTHALMOLOGY
End: 2024-06-26
Payer: COMMERCIAL

## 2024-06-26 DIAGNOSIS — H50.10: ICD-10-CM

## 2024-06-26 DIAGNOSIS — H25.13: ICD-10-CM

## 2024-06-26 DIAGNOSIS — H50.21: ICD-10-CM

## 2024-06-26 PROCEDURE — 99214 OFFICE O/P EST MOD 30 MIN: CPT | Performed by: OPHTHALMOLOGY

## 2024-06-26 PROCEDURE — 92060 SENSORIMOTOR EXAMINATION: CPT | Performed by: OPHTHALMOLOGY

## 2024-06-26 ASSESSMENT — CONFRONTATIONAL VISUAL FIELD TEST (CVF)
OD_FINDINGS: FULL
OS_FINDINGS: FULL

## 2024-09-30 ENCOUNTER — APPOINTMENT (OUTPATIENT)
Dept: ORTHOPEDIC SURGERY | Facility: CLINIC | Age: 54
End: 2024-09-30
Payer: COMMERCIAL

## 2024-09-30 VITALS
SYSTOLIC BLOOD PRESSURE: 142 MMHG | WEIGHT: 254 LBS | HEIGHT: 72 IN | BODY MASS INDEX: 34.4 KG/M2 | HEART RATE: 84 BPM | DIASTOLIC BLOOD PRESSURE: 84 MMHG

## 2024-09-30 DIAGNOSIS — Z96.651 PAIN DUE TO INTERNAL ORTHOPEDIC PROSTHETIC DEVICES, IMPLANTS AND GRAFTS, INITIAL ENCOUNTER: ICD-10-CM

## 2024-09-30 DIAGNOSIS — M79.652 PAIN IN LEFT THIGH: ICD-10-CM

## 2024-09-30 DIAGNOSIS — T84.84XA PAIN DUE TO INTERNAL ORTHOPEDIC PROSTHETIC DEVICES, IMPLANTS AND GRAFTS, INITIAL ENCOUNTER: ICD-10-CM

## 2024-09-30 PROCEDURE — G2211 COMPLEX E/M VISIT ADD ON: CPT | Mod: NC

## 2024-09-30 PROCEDURE — 99215 OFFICE O/P EST HI 40 MIN: CPT

## 2024-09-30 PROCEDURE — 73552 X-RAY EXAM OF FEMUR 2/>: CPT | Mod: LT

## 2024-09-30 NOTE — REVIEW OF SYSTEMS
[Joint Pain] : joint pain [Joint Stiffness] : joint stiffness [Joint Swelling] : joint swelling [Negative] : Heme/Lymph [FreeTextEntry9] : Right knee and left thigh

## 2024-09-30 NOTE — DISCUSSION/SUMMARY
[Medication Risks Reviewed] : Medication risks reviewed [Surgical risks reviewed] : Surgical risks reviewed [de-identified] : 55 y/o M pt is s/p painful right TKA from 2021 and left thigh pain. The nature of his condition and treatment options were discussed in detail. At this time, I do not see any loosening or wear for the right knee implants, and the cause of his pain is unclear. Patient comes in today after he was last seen in May to discuss total knee revision surgery, which he has scheduled to proceed in December. Pt understands that the revision surgery is not guaranteed to give him relief. He came in today just for follow-up for increasing pain and swelling in the knee and calf. I recommend the pt to f/u with rheumatologist for management of pseudogout before proceeding to the revision surgery. I am concerned that he will not get better with revision TKA because we do not have a clear explanation of where his pain is coming from  Regarding the left thigh, today's x-ray is unremarkable. I ordered a left femur MRI to better understand the etiology of the pain. X-ray also showed evidence of mild left knee osteoarthritis. I discussed conservative treatment such as cortisone injections, HA injections, PT, anti-inflammatories, and low impact exercise. He should continue to do low impact exercises. He may take Tylenol and NSAIDs as needed.  F/u after MRI.  The patient is a 54 year old individual with a failing right TKA. Based upon the patient's continued symptoms and failure to respond to conservative treatment (including HA injections, cortisone injections, over the counter medications, and PT) I have recommended a revision right total knee arthroplasty for this patient. A long discussion took place with the patient describing what a total joint replacement is and what the procedure would entail. A total knee arthroplasty model, similar to the implant that will be used during the operation, was utilized to demonstrate and to discuss the various bearing surfaces of the implants. The hospitalization and post-operative care and rehabilitation were also discussed. The use of perioperative antibiotics and DVT prophylaxis were discussed. The risk, benefits and alternatives to a surgical intervention were discussed at length with the patient. The patient was also advised of risks related to the medical comorbidities, elevated body mass index (BMI), and smoking where applicable. We discussed how to reduce modifiable risk factors and encouraged smoking cessation were applicable. A lengthy discussion took place to review the most common complications including but not limited to: deep vein thrombosis, pulmonary embolus, heart attack, stroke, infection, wound breakdown, numbness, damage to nerves, tendon, muscles, arteries or other blood vessels, death and other possible complications from anesthesia. The patient was told that we will take steps to minimize these risks by using sterile technique, antibiotics and DVT prophylaxis when appropriate and follow the patient postoperatively in the office setting to monitor progress. The possibility of recurrent pain, no improvement in pain and actual worsening of pain were also discussed with the patient. The discharge plan of care focused on the patient going home following surgery. The patient was encouraged to make the necessary arrangements to have someone stay with them when they are discharged home. Following discharge, a home care nurse will visit the patient. The home care nurse will open your home care case and request home physical therapy services. Home physical therapy will commence following discharge provided it is appropriate and covered by the health insurance benefit plan.  The benefits of surgery were discussed with the patient including the potential for improving his/her current clinical condition through operative intervention. Alternatives to surgical intervention including continued conservative management were also discussed in detail. All questions were answered to the satisfaction of the patient. The treatment plan of care, as well as a model of a total knee arthroplasty equivalent to the one that will be used for their total joint replacement, was shared with the patient. The patient agreed to the plan of care as well as the use of implants in their total joint replacement.

## 2024-09-30 NOTE — PHYSICAL EXAM
[LE] : Sensory: Intact in bilateral lower extremities [ALL] : dorsalis pedis, posterior tibial, femoral, popliteal, and radial 2+ and symmetric bilaterally [Antalgic] : not antalgic [de-identified] : GENERAL APPEARANCE: Well nourished and hydrated, pleasant, alert, and oriented x 3. Appears their stated age. HEENT: Normocephalic, extraocular eye motion intact. Nasal septum midline. Oral cavity clear. External auditory canal clear. RESPIRATORY: Breath sounds clear and audible in all lobes. No wheezing, No accessory muscle use. CARDIOVASCULAR: No apparent abnormalities. No lower leg edema. No varicosities. Pedal pulses are palpable. NEUROLOGIC: Sensation is normal, no muscle weakness in the upper or lower extremities. DERMATOLOGIC: No apparent skin lesions, moist, warm, no rash. SPINE: Cervical spine appears normal and moves freely; thoracic spine appears normal and moves freely; lumbosacral spine appears normal and moves freely, normal, nontender. MUSCULOSKELETAL: Hands, wrists, and elbows are normal and move freely, shoulders are normal and move freely.    [de-identified] : Musculoskeletal:. Right knee exam shows healed incision with no sign of infection. ROM 0-105 patient has a moderate joint effusion  He has adequate left hip range of motion , negative Stinchfield maneuver [de-identified] : 2V xray of the left femur done in the office today and reviewed by Dr. Agustin Yuen is unremarkable, which demonstrates well preserved joint spaces and mild left knee osteoarthritis.

## 2024-09-30 NOTE — END OF VISIT
[FreeTextEntry3] : I, Joseluis Quintanilla, acted solely as a scribe for Dr. Agustin Yuen on this date 09/30/2024. I personally performed the services described in the documentation, reviewed the documentation recorded by the scribe in my presence, and it accurately and completely records my words and actions.

## 2024-09-30 NOTE — HISTORY OF PRESENT ILLNESS
[Worsening] : worsening [6] : a current pain level of 6/10 [3] : a minimum pain level of 3/10 [9] : a maximum pain level of 9/10 [de-identified] : 9/30/2024:  He is here for follow-up of painful right total knee arthroplasty. We informed him he has a pseudogout, but he does not remember. Patient does not see a rheumatologist for management of pseudogout. Is concerned his pain and weakness he says his right knee zaheer. He also complains of left thigh pain.   5/30/2024: This is a 54 year old male here for re-evaluation of right knee pain. He had RTKA in 2021 at [facility]. He had had prior aspiration which was positive for pseudogout. He had repeat aspiration at [place] with his surgeon which was negative for any infection. he continues to have the sensation of instability in the right knee. He did have MRI which showed well fixed implants without complication. He continues to have swelling of the right knee. The pt takes 100 mg Celebrex in a day. He continues to have pain in the right knee that limits him from doing his normal activities. The pt is losing his quality of life and is unable to enjoy his normal activities. 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  He feels the knee is unstable and is going to give out on him.  He walks with a cane.He was last seen in March to discuss revision surgery of his right knee which he would like to proceed with an December.  He comes in today for follow-up he was recently on vacation and reported some worsening soreness and swelling of the right knee after the trip.  He has also been having pain in the right calf that has been worse with his exercises and when he was having medical massage.

## 2025-02-21 ENCOUNTER — APPOINTMENT (OUTPATIENT)
Age: 55
End: 2025-02-21
Payer: COMMERCIAL

## 2025-02-21 DIAGNOSIS — Z96.651 PAIN DUE TO INTERNAL ORTHOPEDIC PROSTHETIC DEVICES, IMPLANTS AND GRAFTS, INITIAL ENCOUNTER: ICD-10-CM

## 2025-02-21 DIAGNOSIS — T84.84XA PAIN DUE TO INTERNAL ORTHOPEDIC PROSTHETIC DEVICES, IMPLANTS AND GRAFTS, INITIAL ENCOUNTER: ICD-10-CM

## 2025-02-21 PROCEDURE — 99213 OFFICE O/P EST LOW 20 MIN: CPT

## 2025-02-21 RX ORDER — AZELASTINE HYDROCHLORIDE AND FLUTICASONE PROPIONATE 137; 50 UG/1; UG/1
137-50 SPRAY, METERED NASAL
Refills: 0 | Status: ACTIVE | COMMUNITY

## 2025-02-21 RX ORDER — FAMOTIDINE 10 MG/1
10 TABLET, FILM COATED ORAL
Refills: 0 | Status: ACTIVE | COMMUNITY

## 2025-02-21 RX ORDER — PRAZOSIN HYDROCHLORIDE 2 MG/1
2 CAPSULE ORAL
Refills: 0 | Status: ACTIVE | COMMUNITY

## 2025-02-21 RX ORDER — ATORVASTATIN CALCIUM 20 MG/1
20 TABLET, FILM COATED ORAL
Refills: 0 | Status: ACTIVE | COMMUNITY

## 2025-02-21 RX ORDER — PANTOPRAZOLE SODIUM 40 MG/1
40 GRANULE, DELAYED RELEASE ORAL
Refills: 0 | Status: ACTIVE | COMMUNITY

## 2025-02-21 RX ORDER — NACL/NAHCO3/HYALURON SOD/ALOE 0.9 %
SPRAY GEL (ML) NASAL
Refills: 0 | Status: ACTIVE | COMMUNITY

## 2025-02-21 RX ORDER — SODIUM CHLORIDE/SODIUM BICARB 0.9 %
AEROSOL, SPRAY (ML) NASAL
Refills: 0 | Status: ACTIVE | COMMUNITY

## 2025-02-21 RX ORDER — PRAZOSIN HYDROCHLORIDE 1 MG/1
1 CAPSULE ORAL
Refills: 0 | Status: ACTIVE | COMMUNITY

## 2025-02-21 RX ORDER — CHROMIUM 200 MCG
TABLET ORAL
Refills: 0 | Status: ACTIVE | COMMUNITY

## 2025-02-21 RX ORDER — IPRATROPIUM BROMIDE 42 UG/1
SPRAY, METERED NASAL
Refills: 0 | Status: ACTIVE | COMMUNITY

## 2025-02-21 RX ORDER — GLIMEPIRIDE 4 MG/1
4 TABLET ORAL
Refills: 0 | Status: ACTIVE | COMMUNITY

## 2025-02-21 RX ORDER — DULOXETINE HYDROCHLORIDE 30 MG/1
30 CAPSULE, DELAYED RELEASE PELLETS ORAL
Refills: 0 | Status: ACTIVE | COMMUNITY

## 2025-02-21 RX ORDER — DULOXETINE HYDROCHLORIDE 40 MG/1
40 CAPSULE, DELAYED RELEASE PELLETS ORAL
Refills: 0 | Status: ACTIVE | COMMUNITY

## 2025-02-21 RX ORDER — EVOLOCUMAB 140 MG/ML
140 INJECTION, SOLUTION SUBCUTANEOUS
Refills: 0 | Status: ACTIVE | COMMUNITY

## 2025-02-21 RX ORDER — CELECOXIB 200 MG/1
200 CAPSULE ORAL
Refills: 0 | Status: ACTIVE | COMMUNITY

## 2025-02-26 ENCOUNTER — TRANSCRIPTION ENCOUNTER (OUTPATIENT)
Age: 55
End: 2025-02-26

## 2025-02-26 ENCOUNTER — APPOINTMENT (OUTPATIENT)
Dept: RADIOLOGY | Facility: CLINIC | Age: 55
End: 2025-02-26
Payer: COMMERCIAL

## 2025-02-26 ENCOUNTER — APPOINTMENT (OUTPATIENT)
Dept: RHEUMATOLOGY | Facility: CLINIC | Age: 55
End: 2025-02-26
Payer: COMMERCIAL

## 2025-02-26 VITALS
HEART RATE: 86 BPM | HEIGHT: 72 IN | OXYGEN SATURATION: 96 % | TEMPERATURE: 98 F | DIASTOLIC BLOOD PRESSURE: 72 MMHG | RESPIRATION RATE: 17 BRPM | WEIGHT: 245 LBS | SYSTOLIC BLOOD PRESSURE: 136 MMHG | BODY MASS INDEX: 33.18 KG/M2

## 2025-02-26 DIAGNOSIS — L40.9 PSORIASIS, UNSPECIFIED: ICD-10-CM

## 2025-02-26 DIAGNOSIS — Z80.0 FAMILY HISTORY OF MALIGNANT NEOPLASM OF DIGESTIVE ORGANS: ICD-10-CM

## 2025-02-26 DIAGNOSIS — Z86.39 PERSONAL HISTORY OF OTHER ENDOCRINE, NUTRITIONAL AND METABOLIC DISEASE: ICD-10-CM

## 2025-02-26 DIAGNOSIS — Z80.9 FAMILY HISTORY OF MALIGNANT NEOPLASM, UNSPECIFIED: ICD-10-CM

## 2025-02-26 DIAGNOSIS — Z78.9 OTHER SPECIFIED HEALTH STATUS: ICD-10-CM

## 2025-02-26 DIAGNOSIS — M13.0 POLYARTHRITIS, UNSPECIFIED: ICD-10-CM

## 2025-02-26 DIAGNOSIS — Z87.898 PERSONAL HISTORY OF OTHER SPECIFIED CONDITIONS: ICD-10-CM

## 2025-02-26 DIAGNOSIS — M25.50 PAIN IN UNSPECIFIED JOINT: ICD-10-CM

## 2025-02-26 DIAGNOSIS — Z80.1 FAMILY HISTORY OF MALIGNANT NEOPLASM OF TRACHEA, BRONCHUS AND LUNG: ICD-10-CM

## 2025-02-26 PROCEDURE — 99205 OFFICE O/P NEW HI 60 MIN: CPT

## 2025-02-26 PROCEDURE — 73130 X-RAY EXAM OF HAND: CPT | Mod: 50

## 2025-02-26 PROCEDURE — G2211 COMPLEX E/M VISIT ADD ON: CPT | Mod: NC

## 2025-02-26 RX ORDER — CLOPIDOGREL 75 MG/1
75 TABLET, FILM COATED ORAL
Refills: 0 | Status: ACTIVE | COMMUNITY

## 2025-03-19 ENCOUNTER — APPOINTMENT (OUTPATIENT)
Dept: RHEUMATOLOGY | Facility: CLINIC | Age: 55
End: 2025-03-19
Payer: COMMERCIAL

## 2025-03-19 VITALS
OXYGEN SATURATION: 97 % | SYSTOLIC BLOOD PRESSURE: 138 MMHG | DIASTOLIC BLOOD PRESSURE: 60 MMHG | HEIGHT: 72 IN | TEMPERATURE: 96.6 F | HEART RATE: 79 BPM

## 2025-03-19 DIAGNOSIS — Z79.899 OTHER LONG TERM (CURRENT) DRUG THERAPY: ICD-10-CM

## 2025-03-19 DIAGNOSIS — L40.9 PSORIASIS, UNSPECIFIED: ICD-10-CM

## 2025-03-19 DIAGNOSIS — M13.0 POLYARTHRITIS, UNSPECIFIED: ICD-10-CM

## 2025-03-19 DIAGNOSIS — M25.50 PAIN IN UNSPECIFIED JOINT: ICD-10-CM

## 2025-03-19 PROCEDURE — G2211 COMPLEX E/M VISIT ADD ON: CPT | Mod: NC

## 2025-03-19 PROCEDURE — 99215 OFFICE O/P EST HI 40 MIN: CPT

## 2025-03-19 RX ORDER — METHOTREXATE 2.5 MG/1
2.5 TABLET ORAL
Qty: 60 | Refills: 1 | Status: ACTIVE | COMMUNITY
Start: 2025-03-19 | End: 1900-01-01

## 2025-03-25 ENCOUNTER — OFFICE (OUTPATIENT)
Dept: URBAN - METROPOLITAN AREA CLINIC 100 | Facility: CLINIC | Age: 55
Setting detail: OPHTHALMOLOGY
End: 2025-03-25
Payer: COMMERCIAL

## 2025-03-25 DIAGNOSIS — E11.3293: ICD-10-CM

## 2025-03-25 DIAGNOSIS — H50.10: ICD-10-CM

## 2025-03-25 DIAGNOSIS — H50.21: ICD-10-CM

## 2025-03-25 DIAGNOSIS — H25.13: ICD-10-CM

## 2025-03-25 PROCEDURE — 92014 COMPRE OPH EXAM EST PT 1/>: CPT | Performed by: OPHTHALMOLOGY

## 2025-03-25 PROCEDURE — 92060 SENSORIMOTOR EXAMINATION: CPT | Performed by: OPHTHALMOLOGY

## 2025-03-25 ASSESSMENT — REFRACTION_CURRENTRX
OD_AXIS: 020
OS_CYLINDER: -1.00
OD_OVR_VA: 20/
OD_SPHERE: -0.75
OD_VPRISM_DIRECTION: SV
OS_AXIS: 143
OS_VPRISM_DIRECTION: SV
OS_OVR_VA: 20/
OS_SPHERE: -1.25
OD_CYLINDER: -2.00

## 2025-03-25 ASSESSMENT — CONFRONTATIONAL VISUAL FIELD TEST (CVF)
OD_FINDINGS: FULL
OS_FINDINGS: FULL

## 2025-03-25 ASSESSMENT — REFRACTION_MANIFEST
OS_CYLINDER: -0.50
OD_AXIS: 025
OS_SPHERE: -1.50
OD_SPHERE: -1.00
OD_AXIS: 025
OD_VA1: 20/20
OD_VA1: 20/20
OS_SPHERE: -1.50
OS_AXIS: 145
OD_CYLINDER: -1.50
OU_VA: 20/20
OU_VA: 20/20
OD_SPHERE: -1.00
OS_AXIS: 145
OD_CYLINDER: -1.50
OS_VA1: 20/20
OS_CYLINDER: -0.50
OS_VA1: 20/20

## 2025-03-25 ASSESSMENT — REFRACTION_AUTOREFRACTION
OS_SPHERE: -1.75
OD_CYLINDER: -1.50
OS_CYLINDER: -0.50
OD_SPHERE: -1.25
OS_AXIS: 155
OD_AXIS: 018

## 2025-03-25 ASSESSMENT — KERATOMETRY
OS_AXISANGLE_DEGREES: 083
METHOD_AUTO_MANUAL: AUTO
OD_AXISANGLE_DEGREES: 103
OD_K2POWER_DIOPTERS: 44.50
OD_K1POWER_DIOPTERS: 42.25
OS_K2POWER_DIOPTERS: 44.00
OS_K1POWER_DIOPTERS: 43.00

## 2025-03-25 ASSESSMENT — TONOMETRY
OD_IOP_MMHG: 14
OS_IOP_MMHG: 18

## 2025-03-25 ASSESSMENT — VISUAL ACUITY
OS_BCVA: 20/25-2
OD_BCVA: 20/25-2

## 2025-05-02 ENCOUNTER — APPOINTMENT (OUTPATIENT)
Dept: RHEUMATOLOGY | Facility: CLINIC | Age: 55
End: 2025-05-02

## 2025-05-02 ENCOUNTER — NON-APPOINTMENT (OUTPATIENT)
Age: 55
End: 2025-05-02

## 2025-05-02 VITALS
HEART RATE: 86 BPM | SYSTOLIC BLOOD PRESSURE: 114 MMHG | OXYGEN SATURATION: 94 % | HEIGHT: 72 IN | DIASTOLIC BLOOD PRESSURE: 66 MMHG | TEMPERATURE: 97.2 F

## 2025-05-02 PROCEDURE — G2211 COMPLEX E/M VISIT ADD ON: CPT | Mod: NC

## 2025-05-02 PROCEDURE — 99215 OFFICE O/P EST HI 40 MIN: CPT

## 2025-06-20 ENCOUNTER — APPOINTMENT (OUTPATIENT)
Dept: RHEUMATOLOGY | Facility: CLINIC | Age: 55
End: 2025-06-20

## 2025-06-24 ENCOUNTER — APPOINTMENT (OUTPATIENT)
Dept: MRI IMAGING | Facility: CLINIC | Age: 55
End: 2025-06-24
Payer: COMMERCIAL

## 2025-06-24 ENCOUNTER — OUTPATIENT (OUTPATIENT)
Dept: OUTPATIENT SERVICES | Facility: HOSPITAL | Age: 55
LOS: 1 days | End: 2025-06-24
Payer: COMMERCIAL

## 2025-06-24 ENCOUNTER — RESULT REVIEW (OUTPATIENT)
Age: 55
End: 2025-06-24

## 2025-06-24 DIAGNOSIS — Z98.890 OTHER SPECIFIED POSTPROCEDURAL STATES: Chronic | ICD-10-CM

## 2025-06-24 DIAGNOSIS — H50.10 UNSPECIFIED EXOTROPIA: Chronic | ICD-10-CM

## 2025-06-24 DIAGNOSIS — Z98.89 OTHER SPECIFIED POSTPROCEDURAL STATES: Chronic | ICD-10-CM

## 2025-06-24 DIAGNOSIS — L40.9 PSORIASIS, UNSPECIFIED: ICD-10-CM

## 2025-06-24 DIAGNOSIS — L02.91 CUTANEOUS ABSCESS, UNSPECIFIED: Chronic | ICD-10-CM

## 2025-06-24 DIAGNOSIS — M25.50 PAIN IN UNSPECIFIED JOINT: ICD-10-CM

## 2025-06-24 DIAGNOSIS — M13.0 POLYARTHRITIS, UNSPECIFIED: ICD-10-CM

## 2025-06-24 PROCEDURE — 73218 MRI UPPER EXTREMITY W/O DYE: CPT | Mod: 26,RT

## 2025-06-24 PROCEDURE — 73218 MRI UPPER EXTREMITY W/O DYE: CPT
